# Patient Record
Sex: FEMALE | Race: WHITE | Employment: UNEMPLOYED | URBAN - METROPOLITAN AREA
[De-identification: names, ages, dates, MRNs, and addresses within clinical notes are randomized per-mention and may not be internally consistent; named-entity substitution may affect disease eponyms.]

---

## 2016-12-15 LAB
ERYTHROCYTE [DISTWIDTH] IN BLOOD BY AUTOMATED COUNT: 12.6 %
HBV SURFACE AG SERPL QL IA: NEGATIVE
HCT VFR BLD AUTO: 37.1 %
HEMOGLOBIN: 12.1 G/DL
HIV 1+2 AB+HIV1 P24 AG SERPL QL IA: NEGATIVE
MCH RBC QN AUTO: 30 PG
MCHC RBC AUTO-ENTMCNC: 33 G/DL
MCV RBC AUTO: 93 FL
PLATELET # BLD AUTO: 156 10^9/L
RBC # BLD AUTO: 4.01 10^12/L
RUBELLA ANTIBODY IGG QUANTITATIVE: NORMAL IU/ML
WBC # BLD AUTO: 9 10^9/L

## 2017-01-24 ENCOUNTER — OFFICE VISIT (OUTPATIENT)
Dept: CARDIOLOGY | Facility: CLINIC | Age: 27
End: 2017-01-24
Payer: COMMERCIAL

## 2017-01-24 VITALS
HEIGHT: 66 IN | DIASTOLIC BLOOD PRESSURE: 60 MMHG | HEART RATE: 72 BPM | SYSTOLIC BLOOD PRESSURE: 116 MMHG | BODY MASS INDEX: 24.48 KG/M2 | WEIGHT: 152.3 LBS

## 2017-01-24 DIAGNOSIS — R00.2 PALPITATIONS: Primary | ICD-10-CM

## 2017-01-24 PROCEDURE — 99204 OFFICE O/P NEW MOD 45 MIN: CPT | Mod: 25 | Performed by: INTERNAL MEDICINE

## 2017-01-24 PROCEDURE — 93000 ELECTROCARDIOGRAM COMPLETE: CPT | Performed by: INTERNAL MEDICINE

## 2017-01-24 PROCEDURE — T1013 SIGN LANG/ORAL INTERPRETER: HCPCS | Mod: U3 | Performed by: INTERNAL MEDICINE

## 2017-01-24 NOTE — PROGRESS NOTES
HPI and Plan:   See dictation    Orders Placed This Encounter   Procedures     Follow-Up with Cardiac Advanced Practice Provider     EKG 12-lead complete w/read - Clinics (performed today)     Cardiac Event Monitor     Echocardiogram       Orders Placed This Encounter   Medications     DISCONTD: Prenatal MV-Min-Fe Fum-FA-DHA (PRENATAL MULTIVITAMIN + DHA PO)     Sig:      Prenatal MV-Min-Fe Cbn-FA-DHA (PRENATAL PLUS DHA PO)     Sig: Take by mouth daily     Prenatal Vit-Fe Fumarate-FA (PRENATAL PO)     Sig: Take by mouth daily       Medications Discontinued During This Encounter   Medication Reason     Prenatal MV-Min-Fe Fum-FA-DHA (PRENATAL MULTIVITAMIN + DHA PO) Medication Reconciliation Clean Up         Encounter Diagnosis   Name Primary?     Palpitations Yes       CURRENT MEDICATIONS:  Current Outpatient Prescriptions   Medication Sig Dispense Refill     Prenatal MV-Min-Fe Cbn-FA-DHA (PRENATAL PLUS DHA PO) Take by mouth daily       Prenatal Vit-Fe Fumarate-FA (PRENATAL PO) Take by mouth daily         ALLERGIES   No Known Allergies    PAST MEDICAL HISTORY:  Past Medical History   Diagnosis Date     Palpitations        PAST SURGICAL HISTORY:  History reviewed. No pertinent past surgical history.    FAMILY HISTORY:  History reviewed. No pertinent family history.    SOCIAL HISTORY:  Social History     Social History     Marital Status:      Spouse Name: N/A     Number of Children: N/A     Years of Education: N/A     Social History Main Topics     Smoking status: Never Smoker      Smokeless tobacco: None     Alcohol Use: No     Drug Use: None     Sexual Activity: Not Asked     Other Topics Concern     Caffeine Concern Yes     tea or coffee 2-3 per day     Special Diet No     Exercise No     Social History Narrative       Review of Systems:  Skin:  Negative       Eyes:  Negative      ENT:  Negative      Respiratory:  Positive for shortness of breath;dyspnea on exertion;dyspnea at rest     Cardiovascular:     "palpitations;Positive for;chest pain;edema    Gastroenterology: Positive for heartburn    Genitourinary:  Negative      Musculoskeletal:    nocturnal cramping    Neurologic:  Negative      Psychiatric:  Negative      Heme/Lymph/Imm:  Negative      Endocrine:  Negative        Physical Exam:  Vitals: /60 mmHg  Pulse 72  Ht 1.676 m (5' 6\")  Wt 69.083 kg (152 lb 4.8 oz)  BMI 24.59 kg/m2    Constitutional:  cooperative;well developed;in no acute distress        Skin:  warm and dry to the touch        Head:  normocephalic        Eyes:  sclera white        ENT:  no pallor or cyanosis        Neck:  no stiffness        Chest:  clear to auscultation          Cardiac: regular rhythm;normal S1 and S2                  Abdomen:  abdomen soft        Vascular: pulses full and equal                                        Extremities and Back:  no edema              Neurological:  affect appropriate              CC  No referring provider defined for this encounter.                "

## 2017-01-24 NOTE — Clinical Note
1/24/2017    JAMIE MEJIA MD  [address]  [facility] [address]    RE: Linda Grijalva       Dear Colleague,    I had the pleasure of seeing Linda Grijalva in the [facility].    REQUESTING PROVIDER:  Columba Jarrell CNM.      INDICATION:  Palpitations.      Ms. Grijalva is a pleasant 26-year-old Zimbabwean-speaking female originally from [facility] [place] who moved to the [place] 7 years ago.  This evaluation is performed with the help of a Zimbabwean .      The patient states that she has had palpitations since the age of 10.  She previously did not seek medical care in [facility] but upon coming to the [place] was seen by a cardiologist when she was 19.  No definitive diagnosis was made.  She saw an additional cardiologist at an outside facility several years ago and was placed on metoprolol but was not given a diagnosis.  She stopped the metoprolol because of fatigue.      The patient states that her palpitations are quite irregular.  She can have 2 in 1 week and then not have palpitations in 4 months.  When it occurs, it is the sudden onset of a fast heart rate associated with generalized feelings of malaise.  Typically if she bears down, the palpitations will slow down or cease.  She does not have any other associated symptoms with the palpitations and has never had syncope or presyncope.      The patient is currently pregnant and is taking prenatal vitamins.  She has only had 1 episode since she has become pregnant.      We performed a 12-lead ECG in the office which shows sinus rhythm and is essentially unremarkable.      Please see my separate note with a full list of her allergies, medications, past medical history, social history, family history and review of systems.      Please see my full physical examination.   Outpatient Encounter Prescriptions as of 1/24/2017   Medication Sig Dispense Refill     Prenatal MV-Min-Fe Cbn-FA-DHA  (PRENATAL PLUS DHA PO) Take by mouth daily       Prenatal Vit-Fe Fumarate-FA (PRENATAL PO) Take by mouth daily       [DISCONTINUED] Prenatal MV-Min-Fe Fum-FA-DHA (PRENATAL MULTIVITAMIN + DHA PO)        No facility-administered encounter medications on file as of 1/24/2017.      IMPRESSION AND PLAN:  Mrs. Grijalva is a pleasant 26-year-old female with palpitations that appear consistent with possible paroxysmal supraventricular tachycardia.  Her 12-lead ECG shows sinus rhythm, and while there is a sort of slurring to the R-wave, I do not believe it represents a delta wave.  At this time, I have asked the patient to undergo a transthoracic echocardiogram to make sure that she has a structurally normal heart.  I will have her wear a 30-day event monitor, and hopefully we can capture 1 of these arrhythmias.  I believe that she has had a TSH performed through her OB's office, but I have instructed the patient to contact the OB's office and if a TSH has not been performed we can order this.  Finally, I have asked the patient to return to see one of EP APPs after the Holter and transthoracic echocardiogram have been completed.      Thank you for this consultation.     Sincerely,    Ayo Velazquez MD     [facility]

## 2017-01-25 ENCOUNTER — DOCUMENTATION ONLY (OUTPATIENT)
Dept: CARDIOLOGY | Facility: CLINIC | Age: 27
End: 2017-01-25

## 2017-01-25 DIAGNOSIS — R00.2 PALPITATIONS: Primary | ICD-10-CM

## 2017-01-25 NOTE — PROGRESS NOTES
REQUESTING PROVIDER:  Columba Jarrell CNM.      INDICATION:  Palpitations.      HISTORY OF PRESENT ILLNESS:  Ms. Grijalva is a pleasant 26-year-old Haitian-speaking female originally from [facility] who moved to the [place] years ago.  This evaluation is performed with the help of a Haitian .      The patient states that she has had palpitations since the age of 10.  She previously did not seek medical care in [facility] but upon coming to the [place] was seen by a cardiologist when she was 19.  No definitive diagnosis was made.  She saw an additional cardiologist at an outside facility several years ago and was placed on metoprolol but was not given a diagnosis.  She stopped the metoprolol because of fatigue.      The patient states that her palpitations are quite irregular.  She can have 2 in 1 week and then not have palpitations in 4 months.  When it occurs, it is the sudden onset of a fast heart rate associated with generalized feelings of malaise.  Typically if she bears down, the palpitations will slow down or cease.  She does not have any other associated symptoms with the palpitations and has never had syncope or presyncope.      The patient is currently pregnant and is taking prenatal vitamins.  She has only had 1 episode since she has become pregnant.      We performed a 12-lead ECG in the office which shows sinus rhythm and is essentially unremarkable.      Please see my separate note with a full list of her allergies, medications, past medical history, social history, family history and review of systems.      Please see my full physical examination.      IMPRESSION AND PLAN:  Mrs. Grijalva is a pleasant 26-year-old female with palpitations that appear consistent with possible paroxysmal supraventricular tachycardia.  Her 12-lead ECG shows sinus rhythm, and while there is a sort of slurring to the R-wave, I do not believe it represents a delta wave.  At this time, I have  asked the patient to undergo a transthoracic echocardiogram to make sure that she has a structurally normal heart.  I will have her wear a 30-day event monitor, and hopefully we can capture 1 of these arrhythmias.  I believe that she has had a TSH performed through her OB's office, but I have instructed the patient to contact the OB's office and if a TSH has not been performed we can order this.  Finally, I have asked the patient to return to see one of EP APPs after the Holter and transthoracic echocardiogram have been completed.      Thank you for this consultation.         LIANG GTUIERRES MD             D: 2017 14:19   T: 2017 22:23   MT: ELVIA      Name:     IRAJ BUSH   MRN:      [id number]        Account:      [id number]   :      1990           Service Date: 2017      Document: [id number]

## 2017-01-25 NOTE — PROGRESS NOTES
Received records from [facility], no TSH included. Order placed to have TSH done with other testing. Message to scheduling to call patient and add TSH to 1/30/17 appointments.  Labs sent to be entered, records sent to scan

## 2017-01-26 DIAGNOSIS — R00.2 PALPITATIONS: Primary | ICD-10-CM

## 2017-01-30 ENCOUNTER — HOSPITAL ENCOUNTER (OUTPATIENT)
Dept: CARDIOLOGY | Facility: CLINIC | Age: 27
Discharge: HOME | End: 2017-01-30
Attending: INTERNAL MEDICINE | Admitting: INTERNAL MEDICINE
Payer: COMMERCIAL

## 2017-01-30 DIAGNOSIS — R00.2 PALPITATIONS: ICD-10-CM

## 2017-01-30 PROCEDURE — 93306 TTE W/DOPPLER COMPLETE: CPT

## 2017-01-30 PROCEDURE — 93272 ECG/REVIEW INTERPRET ONLY: CPT | Performed by: INTERNAL MEDICINE

## 2017-01-30 PROCEDURE — 93306 TTE W/DOPPLER COMPLETE: CPT | Mod: 26 | Performed by: INTERNAL MEDICINE

## 2017-01-30 PROCEDURE — 93270 REMOTE 30 DAY ECG REV/REPORT: CPT

## 2017-02-01 ENCOUNTER — DOCUMENTATION ONLY (OUTPATIENT)
Dept: CARDIOLOGY | Facility: CLINIC | Age: 27
End: 2017-02-01

## 2017-02-01 NOTE — PROGRESS NOTES
Baseline Sinus Rhythm HR 85bpm   Second transmissions HR 77 bpm moderate activity Symtpoms Skipped beats. Sent to file.

## 2017-02-04 ENCOUNTER — TELEPHONE (OUTPATIENT)
Dept: OTHER | Facility: CLINIC | Age: 27
End: 2017-02-04

## 2017-02-04 ENCOUNTER — DOCUMENTATION ONLY (OUTPATIENT)
Dept: CARDIOLOGY | Facility: CLINIC | Age: 27
End: 2017-02-04

## 2017-02-04 DIAGNOSIS — I47.10 PAROXYSMAL SUPRAVENTRICULAR TACHYCARDIA (H): Primary | ICD-10-CM

## 2017-02-04 NOTE — PROGRESS NOTES
At OV 1-24-17  Dr. Velazquez's  recommendation was for TSH. Lab record request faxed to PMD, OB doctor for TSH lab . If not done,  lab may be ordered at OV 2-27-17 with . HENRY. OV.

## 2017-02-04 NOTE — TELEPHONE ENCOUNTER
I was called by the event monitoring service at 1215 AM. The patient recorded a 90 second episode of narrow complex tachycardia with rates from 180-220 bpm. At the time of notification the patient was in sinus at 64 bpm. There were no further recurrences overnight.     I contacted the patient this morning with the assistance of a Mosotho telephone . She has had episodes similar to this since age 10. Her palpitations continue to occur unpredictably, but she is reliably able to bear down and break the SVT. She has never had an episode that she cannot terminate. She had transient left chest discomfort with the SVT last night, but denied dizziness or shortness of breath. She is 18 weeks pregnant and taking no medications.    I instructed the patient that if she experiences recurrence which she cannot immediately break with bearing down that she should call 911. Our office will contact her Monday to schedule more immediate follow up.     Billie Deras MD

## 2017-02-06 NOTE — PROGRESS NOTES
cardionet strip 2/2/17 showing sinus rhythm @ 100 BPM noted.Strips placed in event folder.    cardionet strip 2/3/17 showing SVT @ 223 BPM noted. Patient's PMD was called by cardionet at time of event. Patient was contacted and per notes indicated she can break her SVT on her own. Patient has EP follow up 2/27/17 with HENRY Kate Dowell. Strips placed in event folder.

## 2017-02-23 PROBLEM — R00.2 PALPITATIONS: Status: ACTIVE | Noted: 2017-02-23

## 2017-02-23 NOTE — PROGRESS NOTES
cardionet strip 2/21/17 showing sinus tachycardia @ 120 BPM noted, strip placed in event folder  Spoke with PMD clinic and patient did not set up any visits yet, they have no records for patient

## 2017-02-27 ENCOUNTER — OFFICE VISIT (OUTPATIENT)
Dept: CARDIOLOGY | Facility: CLINIC | Age: 27
End: 2017-02-27
Attending: INTERNAL MEDICINE
Payer: COMMERCIAL

## 2017-02-27 VITALS
HEART RATE: 80 BPM | HEIGHT: 66 IN | SYSTOLIC BLOOD PRESSURE: 120 MMHG | BODY MASS INDEX: 25.75 KG/M2 | WEIGHT: 160.2 LBS | DIASTOLIC BLOOD PRESSURE: 64 MMHG

## 2017-02-27 DIAGNOSIS — R00.2 PALPITATIONS: ICD-10-CM

## 2017-02-27 PROCEDURE — 99214 OFFICE O/P EST MOD 30 MIN: CPT | Performed by: PHYSICIAN ASSISTANT

## 2017-02-27 NOTE — PROGRESS NOTES
"HPI and Plan:   See dictation #[id number]    Orders Placed This Encounter   Procedures     Follow-Up with Electrophysiologist       Medications Discontinued During This Encounter   Medication Reason     Prenatal Vit-Fe Fumarate-FA (PRENATAL PO) Medication Reconciliation Clean Up       Encounter Diagnosis   Name Primary?     Palpitations        CURRENT MEDICATIONS:  Current Outpatient Prescriptions   Medication Sig Dispense Refill     Prenatal MV-Min-Fe Cbn-FA-DHA (PRENATAL PLUS DHA PO) Take by mouth daily         ALLERGIES   No Known Allergies    PAST MEDICAL HISTORY:  Past Medical History   Diagnosis Date     Palpitations        PAST SURGICAL HISTORY:  History reviewed. No pertinent past surgical history.    FAMILY HISTORY:  Family History   Problem Relation Age of Onset     Coronary Artery Disease No family hx of        SOCIAL HISTORY:  Social History     Social History     Marital status:      Spouse name: N/A     Number of children: N/A     Years of education: N/A     Social History Main Topics     Smoking status: Never Smoker     Smokeless tobacco: None     Alcohol use No     Drug use: None     Sexual activity: Not Asked     Other Topics Concern     Caffeine Concern Yes     tea or coffee 2-3 per day     Special Diet No     Exercise No     Social History Narrative       Review of Systems:  Skin:  Negative       Eyes:  Negative      ENT:  Negative      Respiratory:  Negative for shortness of breath;dyspnea on exertion     Cardiovascular:  lightheadedness;dizziness;Negative for palpitations;Positive for;fatigue;exercise intolerance    Gastroenterology: Positive for heartburn    Genitourinary:  Negative      Musculoskeletal:  Negative      Neurologic:  Negative      Psychiatric:  Negative      Heme/Lymph/Imm:  Negative      Endocrine:  Negative        Physical Exam:  Vitals: /64  Pulse 80  Ht 1.676 m (5' 6\")  Wt 72.7 kg (160 lb 3.2 oz)  BMI 25.86 kg/m2    Constitutional:  cooperative;well developed;in no " acute distress        Skin:  warm and dry to the touch        Head:  normocephalic        Eyes:  sclera white;pupils equal and round;conjunctivae and lids unremarkable;no xanthalasma        ENT:  no pallor or cyanosis        Neck:  JVP normal        Chest:  normal breath sounds, clear to auscultation, normal A-P diameter, normal symmetry, normal respiratory excursion, no use of accessory muscles          Cardiac: regular rhythm;normal S1 and S2                  Abdomen:      Gravid ~ 19 weeks    Vascular: pulses full and equal                                        Extremities and Back:  no edema;no deformities, clubbing, cyanosis, erythema observed              Neurological:  affect appropriate

## 2017-02-27 NOTE — PATIENT INSTRUCTIONS
1. Reviewed NORMAL echocardiogram. Strong heart with good heart pumping function. Normal valves    2. Reviewed 30 day monitor showing episodes of Supraventricular Tachycardia (SVT) - a fast heart rate that comes from the top of the heart.   Options for treatment include: DO NOTHING - this is not a dangerous rhythm UNLESS you are getting lightheaded/dizzy or chest pain with it.                                                                RESTART metoprolol - this is a medicine that we can use during pregnancy for these funny rhythms.        ABLATION - a procedure in the hospital (go home same day) to 'short circuit' this area of the electrical system that is causing heart to go too fast.  We'd prefer to wait until after you deliver if possible.    3. Let's make a plan to reevaluate after you deliver to determine if we should start medications or go ahead with ablation.    4. MY NURSE IS TACO: [phone]

## 2017-02-27 NOTE — LETTER
2/27/2017    [facility] ARIAN MEJIA MD  [address]  [facility] [address]    RE: Linda Grijalva       Dear Colleague,    I had the pleasure of meeting Linda today when she came accompanied by her Dominican  for followup of her recent testing.  She is a very pleasant 26-year-old who is pregnant at roughly 19-20 weeks.  She has had a long history of palpitations dating back to when she was just 10 years old.  She [place] cardiologist [place] she was 19.  She does not remember having any sort of definitive diagnosis but was placed on metoprolol.  She stopped the metoprolol due to fatigue.      She saw Dr. Velazquez in consultation in January.  At that time, he obtained an echocardiogram and a 30-day event monitor.      She is now back for followup stating that she had 1 episode of her typical palpitations.  This correlates with SVT on her event monitor, Friday, 02/03, at 10:30 p.m.  She states that the episode came on out of the blue like it typically does.  She did vagal maneuvers and was able to break the SVT.  Monitor showed SVT at roughly 223 beats per minute.      Event monitor otherwise showed normal sinus rhythm with no significant ectopy.  She did have some sinus tachycardia noted.      Echocardiogram done 01/30 showed a normal ejection fraction at 60%-65% with normal valves and normal right ventricular function.     Outpatient Encounter Prescriptions as of 2/27/2017   Medication Sig Dispense Refill     Prenatal MV-Min-Fe Cbn-FA-DHA (PRENATAL PLUS DHA PO) Take by mouth daily       [DISCONTINUED] Prenatal Vit-Fe Fumarate-FA (PRENATAL PO) Take by mouth daily       No facility-administered encounter medications on file as of 2/27/2017.       ASSESSMENT AND PLAN:   1.  SVT.  Her event monitor showed an episode of symptomatic SVT on 02/03 which broke with vagal maneuvers, which she has done since she was 10 years old to try to help improve this.  She cannot think of anything that  "triggered it.      At this time, we talked about our options including doing nothing, which we can do only given that she has not had any severe presyncope or syncopal episodes associated with this.  We also could restart metoprolol during pregnancy, being careful to evaluate for any intrauterine growth delay, or an ablation.  I explained that an ablation would be somewhat of a last resort during pregnancy, and we would prefer to avoid this until after she delivers      Linda tells me that overall she is really doing well and only wanted to get this evaluated so she could have it documented for her OB/GYN in case something she were to go into an arrhythmia during labor or delivery.  It does appear that this is SVT, and I explained that this is generally a very symptomatic but not dangerous rhythm.  Certainly things would change if she were to become hemodynamically unstable with very rapid heart rates or prolonged episodes but all of her episodes have been able to break with a vagal maneuver.      She is not interested in starting medications, which we are very comfortable with.  At this time, she will continue \"watching and waiting.\"  I explained that we could always restart metoprolol if needed, and if she were to have any issues or concerns during delivery, certainly [facility] could consult Electrophysiology.      At this time, we will plan to have her visit with one of our electrophysiologists in August or September (when the baby is 2-3 months old) to discuss her symptoms and if she would like to proceed with ablation.      It has been a pleasure to see her in clinic.  Again, she was instructed to contact us or seek immediate attention she have any presyncope or syncopal episodes associated.     Sincerely,    Raeann Dowell PA-C     [facility]        "

## 2017-02-27 NOTE — MR AVS SNAPSHOT
After Visit Summary   2/27/2017    Linda Grijalva    MRN: [id number]           Patient Information     Date Of Birth          1990        Visit Information        Provider Department      2/27/2017 12:15 PM Raeann Dowell PA-C; [employer] [facility]        Today's Diagnoses     Palpitations          Care Instructions    1. Reviewed NORMAL echocardiogram. Strong heart with good heart pumping function. Normal valves    2. Reviewed 30 day monitor showing episodes of Supraventricular Tachycardia (SVT) - a fast heart rate that comes from the top of the heart.   Options for treatment include: DO NOTHING - this is not a dangerous rhythm UNLESS you are getting lightheaded/dizzy or chest pain with it.                                                                RESTART metoprolol - this is a medicine that we can use during pregnancy for these funny rhythms.        ABLATION - a procedure in the hospital (go home same day) to 'short circuit' this area of the electrical system that is causing heart to go too fast.  We'd prefer to wait until after you deliver if possible.    3. Let's make a plan to reevaluate after you deliver to determine if we should start medications or go ahead with ablation.    4. MY NURSE IS TACO: [phone]                                                               Follow-ups after your visit        Who to contact     If you have questions or need follow up information about today's clinic visit or your schedule please contact [facility] [place] directly at [phone].  Normal or non-critical lab and imaging results will be communicated to you by MyChart, letter or phone within 4 business days after the clinic has received the results. If you do not hear from us within 7 days, please contact the clinic through MyChart or phone. If you have a critical or abnormal lab  "result, we will notify you by phone as soon as possible.  Submit refill requests through [employer] or call your pharmacy and they will forward the refill request to us. Please allow 3 business days for your refill to be completed.          Additional Information About Your Visit        [employer] Information     [employer] lets you send messages to your doctor, view your test results, renew your prescriptions, schedule appointments and more. To sign up, go to [email or web address] . Click on \"Log in\" on the left side of the screen, which will take you to the Welcome page. Then click on \"Sign up Now\" on the right side of the page.     You will be asked to enter the access code listed below, as well as some personal information. Please follow the directions to create your username and password.     Your access code is: [email or web address]  Expires: 2017 12:52 PM     Your access code will  in 90 days. If you need help or a new code, please call your [facility] clinic or [phone].        Care EveryWhere ID     This is your Care EveryWhere ID. This could be used by other organizations to access your [facility] medical records  [id number]        Your Vitals Were     Pulse Height BMI (Body Mass Index)             80 1.676 m (5' 6\") 25.86 kg/m2          Blood Pressure from Last 3 Encounters:   17 120/64   17 116/60    Weight from Last 3 Encounters:   17 72.7 kg (160 lb 3.2 oz)   17 69.1 kg (152 lb 4.8 oz)              We Performed the Following     Follow-Up with Cardiac Advanced Practice Provider        Primary Care Provider Office Phone # Fax #    Fam Phys MD Dianna [phone] [address]  [facility] [address]        Thank you!     Thank you for choosing [facility] [place]  for your care. Our goal is always to provide you with excellent care. Hearing back from our patients is one way we can continue to improve our " services. Please take a few minutes to complete the written survey that you may receive in the mail after your visit with us. Thank you!             Your Updated Medication List - Protect others around you: Learn how to safely use, store and throw away your medicines at www.disposemymeds.org.          This list is accurate as of: 2/27/17 12:52 PM.  Always use your most recent med list.                   Brand Name Dispense Instructions for use    PRENATAL PLUS DHA PO      Take by mouth daily

## 2017-02-28 NOTE — PROGRESS NOTES
HISTORY OF PRESENT ILLNESS:  I had the pleasure of meeting Linda today when she came accompanied by her Malagasy  for followup of her recent testing.  She is a very pleasant 26-year-old who is pregnant at roughly 19-20 weeks.  She has had a long history of palpitations dating back to when she was just 10 years old.  She saw a cardiologist in the [place] when she was 19.  She does not remember having any sort of definitive diagnosis but was placed on metoprolol.  She stopped the metoprolol due to fatigue.      She saw Dr. Velazquez in consultation in January.  At that time, he obtained an echocardiogram and a 30-day event monitor.      She is now back for followup stating that she had 1 episode of her typical palpitations.  This correlates with SVT on her event monitor, Friday, 02/03, at 10:30 p.m.  She states that the episode came on out of the blue like it typically does.  She did vagal maneuvers and was able to break the SVT.  Monitor showed SVT at roughly 223 beats per minute.      Event monitor otherwise showed normal sinus rhythm with no significant ectopy.  She did have some sinus tachycardia noted.      Echocardiogram done 01/30 showed a normal ejection fraction at 60%-65% with normal valves and normal right ventricular function.      ASSESSMENT AND PLAN:   1.  SVT.  Her event monitor showed an episode of symptomatic SVT on 02/03 which broke with vagal maneuvers, which she has done since she was 10 years old to try to help improve this.  She cannot think of anything that triggered it.      At this time, we talked about our options including doing nothing, which we can do only given that she has not had any severe presyncope or syncopal episodes associated with this.  We also could restart metoprolol during pregnancy, being careful to evaluate for any intrauterine growth delay, or an ablation.  I explained that an ablation would be somewhat of a last resort during pregnancy, and we would prefer to avoid  "this until after she delivers      Iraj tells me that overall she is really doing well and only wanted to get this evaluated so she could have it documented for her OB/GYN in case something she were to go into an arrhythmia during labor or delivery.  It does appear that this is SVT, and I explained that this is generally a very symptomatic but not dangerous rhythm.  Certainly things would change if she were to become hemodynamically unstable with very rapid heart rates or prolonged episodes but all of her episodes have been able to break with a vagal maneuver.      She is not interested in starting medications, which we are very comfortable with.  At this time, she will continue \"watching and waiting.\"  I explained that we could always restart metoprolol if needed, and if she were to have any issues or concerns during delivery, certainly [facility] could consult Electrophysiology.      At this time, we will plan to have her visit with one of our electrophysiologists in August or September (when the baby is 2-3 months old) to discuss her symptoms and if she would like to proceed with ablation.      It has been a pleasure to see her in clinic.  Again, she was instructed to contact us or seek immediate attention she have any presyncope or syncopal episodes associated.         SANTOS CAO PA-C             D: 2017 13:08   T: 2017 19:12   MT: ELVIA      Name:     IRAJ BUSH   MRN:      [id number]        Account:      [id number]   :      1990           Service Date: 2017      Document: [id number]      "

## 2017-06-08 LAB — GROUP B STREP PCR: NEGATIVE

## 2017-06-25 ENCOUNTER — HOSPITAL ENCOUNTER (OUTPATIENT)
Facility: CLINIC | Age: 27
Discharge: HOME | End: 2017-06-25
Attending: OBSTETRICS & GYNECOLOGY | Admitting: OBSTETRICS & GYNECOLOGY
Payer: COMMERCIAL

## 2017-06-25 ENCOUNTER — HOSPITAL ENCOUNTER (INPATIENT)
Facility: CLINIC | Age: 27
LOS: 2 days | Discharge: HOME HEALTH | End: 2017-06-27
Attending: OBSTETRICS & GYNECOLOGY | Admitting: OBSTETRICS & GYNECOLOGY
Payer: COMMERCIAL

## 2017-06-25 ENCOUNTER — ANESTHESIA EVENT (OUTPATIENT)
Dept: OBGYN | Facility: CLINIC | Age: 27
End: 2017-06-25
Payer: COMMERCIAL

## 2017-06-25 ENCOUNTER — HOSPITAL ENCOUNTER (INPATIENT)
Facility: CLINIC | Age: 27
LOS: 1 days | Discharge: HOME | End: 2017-06-25
Attending: OBSTETRICS & GYNECOLOGY | Admitting: OBSTETRICS & GYNECOLOGY
Payer: COMMERCIAL

## 2017-06-25 ENCOUNTER — ANESTHESIA (OUTPATIENT)
Dept: OBGYN | Facility: CLINIC | Age: 27
End: 2017-06-25
Payer: COMMERCIAL

## 2017-06-25 VITALS
RESPIRATION RATE: 18 BRPM | HEIGHT: 66 IN | SYSTOLIC BLOOD PRESSURE: 126 MMHG | BODY MASS INDEX: 29.89 KG/M2 | TEMPERATURE: 97.4 F | WEIGHT: 186 LBS | DIASTOLIC BLOOD PRESSURE: 82 MMHG

## 2017-06-25 VITALS — DIASTOLIC BLOOD PRESSURE: 89 MMHG | SYSTOLIC BLOOD PRESSURE: 134 MMHG | TEMPERATURE: 97.6 F | RESPIRATION RATE: 16 BRPM

## 2017-06-25 DIAGNOSIS — D62 ANEMIA DUE TO BLOOD LOSS, ACUTE: Primary | ICD-10-CM

## 2017-06-25 LAB
ABO + RH BLD: NORMAL
ABO + RH BLD: NORMAL
SPECIMEN EXP DATE BLD: NORMAL

## 2017-06-25 PROCEDURE — 99213 OFFICE O/P EST LOW 20 MIN: CPT

## 2017-06-25 PROCEDURE — 99214 OFFICE O/P EST MOD 30 MIN: CPT

## 2017-06-25 PROCEDURE — 12000029 ZZH R&B OB INTERMEDIATE

## 2017-06-25 PROCEDURE — 00HU33Z INSERTION OF INFUSION DEVICE INTO SPINAL CANAL, PERCUTANEOUS APPROACH: ICD-10-PCS | Performed by: ANESTHESIOLOGY

## 2017-06-25 PROCEDURE — 37000011 ZZH ANESTHESIA WARD SERVICE

## 2017-06-25 PROCEDURE — 25000128 H RX IP 250 OP 636

## 2017-06-25 PROCEDURE — 99215 OFFICE O/P EST HI 40 MIN: CPT

## 2017-06-25 PROCEDURE — 12000031 ZZH R&B OB CRITICAL

## 2017-06-25 PROCEDURE — 25000125 ZZHC RX 250: Performed by: ANESTHESIOLOGY

## 2017-06-25 PROCEDURE — 3E0R3CZ INTRODUCTION OF REGIONAL ANESTHETIC INTO SPINAL CANAL, PERCUTANEOUS APPROACH: ICD-10-PCS | Performed by: ANESTHESIOLOGY

## 2017-06-25 PROCEDURE — 86900 BLOOD TYPING SEROLOGIC ABO: CPT | Performed by: OBSTETRICS & GYNECOLOGY

## 2017-06-25 PROCEDURE — 86780 TREPONEMA PALLIDUM: CPT | Performed by: OBSTETRICS & GYNECOLOGY

## 2017-06-25 PROCEDURE — 25000128 H RX IP 250 OP 636: Performed by: OBSTETRICS & GYNECOLOGY

## 2017-06-25 PROCEDURE — 86901 BLOOD TYPING SEROLOGIC RH(D): CPT | Performed by: OBSTETRICS & GYNECOLOGY

## 2017-06-25 PROCEDURE — 40000671 ZZH STATISTIC ANESTHESIA CASE

## 2017-06-25 PROCEDURE — S0020 INJECTION, BUPIVICAINE HYDRO: HCPCS | Performed by: ANESTHESIOLOGY

## 2017-06-25 RX ORDER — OXYCODONE AND ACETAMINOPHEN 5; 325 MG/1; MG/1
1 TABLET ORAL
Status: DISCONTINUED | OUTPATIENT
Start: 2017-06-25 | End: 2017-06-25 | Stop reason: HOSPADM

## 2017-06-25 RX ORDER — SODIUM CHLORIDE, SODIUM LACTATE, POTASSIUM CHLORIDE, CALCIUM CHLORIDE 600; 310; 30; 20 MG/100ML; MG/100ML; MG/100ML; MG/100ML
INJECTION, SOLUTION INTRAVENOUS CONTINUOUS
Status: DISCONTINUED | OUTPATIENT
Start: 2017-06-25 | End: 2017-06-26

## 2017-06-25 RX ORDER — NALBUPHINE HYDROCHLORIDE 10 MG/ML
2.5-5 INJECTION, SOLUTION INTRAMUSCULAR; INTRAVENOUS; SUBCUTANEOUS EVERY 6 HOURS PRN
Status: DISCONTINUED | OUTPATIENT
Start: 2017-06-25 | End: 2017-06-26

## 2017-06-25 RX ORDER — METHYLERGONOVINE MALEATE 0.2 MG/ML
200 INJECTION INTRAVENOUS
Status: DISCONTINUED | OUTPATIENT
Start: 2017-06-25 | End: 2017-06-25 | Stop reason: HOSPADM

## 2017-06-25 RX ORDER — EPHEDRINE SULFATE 50 MG/ML
INJECTION, SOLUTION INTRAMUSCULAR; INTRAVENOUS; SUBCUTANEOUS
Status: DISCONTINUED
Start: 2017-06-25 | End: 2017-06-26 | Stop reason: HOSPADM

## 2017-06-25 RX ORDER — ONDANSETRON 2 MG/ML
4 INJECTION INTRAMUSCULAR; INTRAVENOUS EVERY 6 HOURS PRN
Status: DISCONTINUED | OUTPATIENT
Start: 2017-06-25 | End: 2017-06-25 | Stop reason: HOSPADM

## 2017-06-25 RX ORDER — ACETAMINOPHEN 325 MG/1
650 TABLET ORAL EVERY 4 HOURS PRN
Status: DISCONTINUED | OUTPATIENT
Start: 2017-06-25 | End: 2017-06-25 | Stop reason: HOSPADM

## 2017-06-25 RX ORDER — BUPIVACAINE HYDROCHLORIDE 2.5 MG/ML
INJECTION, SOLUTION EPIDURAL; INFILTRATION; INTRACAUDAL PRN
Status: DISCONTINUED | OUTPATIENT
Start: 2017-06-25 | End: 2017-06-25

## 2017-06-25 RX ORDER — HYDROXYZINE HYDROCHLORIDE 50 MG/1
100 TABLET, FILM COATED ORAL ONCE
Status: COMPLETED | OUTPATIENT
Start: 2017-06-25 | End: 2017-06-25

## 2017-06-25 RX ORDER — ACETAMINOPHEN 325 MG/1
650 TABLET ORAL EVERY 4 HOURS PRN
Status: DISCONTINUED | OUTPATIENT
Start: 2017-06-25 | End: 2017-06-26

## 2017-06-25 RX ORDER — OXYTOCIN 10 [USP'U]/ML
10 INJECTION, SOLUTION INTRAMUSCULAR; INTRAVENOUS
Status: DISCONTINUED | OUTPATIENT
Start: 2017-06-25 | End: 2017-06-25 | Stop reason: HOSPADM

## 2017-06-25 RX ORDER — CARBOPROST TROMETHAMINE 250 UG/ML
250 INJECTION, SOLUTION INTRAMUSCULAR
Status: DISCONTINUED | OUTPATIENT
Start: 2017-06-25 | End: 2017-06-26

## 2017-06-25 RX ORDER — NALOXONE HYDROCHLORIDE 0.4 MG/ML
.1-.4 INJECTION, SOLUTION INTRAMUSCULAR; INTRAVENOUS; SUBCUTANEOUS
Status: DISCONTINUED | OUTPATIENT
Start: 2017-06-25 | End: 2017-06-25 | Stop reason: HOSPADM

## 2017-06-25 RX ORDER — IBUPROFEN 800 MG/1
800 TABLET, FILM COATED ORAL
Status: DISCONTINUED | OUTPATIENT
Start: 2017-06-25 | End: 2017-06-26

## 2017-06-25 RX ORDER — NALOXONE HYDROCHLORIDE 0.4 MG/ML
.1-.4 INJECTION, SOLUTION INTRAMUSCULAR; INTRAVENOUS; SUBCUTANEOUS
Status: DISCONTINUED | OUTPATIENT
Start: 2017-06-25 | End: 2017-06-26

## 2017-06-25 RX ORDER — LIDOCAINE 40 MG/G
CREAM TOPICAL
Status: DISCONTINUED | OUTPATIENT
Start: 2017-06-25 | End: 2017-06-25 | Stop reason: HOSPADM

## 2017-06-25 RX ORDER — OXYCODONE AND ACETAMINOPHEN 5; 325 MG/1; MG/1
1 TABLET ORAL
Status: DISCONTINUED | OUTPATIENT
Start: 2017-06-25 | End: 2017-06-26

## 2017-06-25 RX ORDER — FENTANYL CITRATE 50 UG/ML
50-100 INJECTION, SOLUTION INTRAMUSCULAR; INTRAVENOUS
Status: DISCONTINUED | OUTPATIENT
Start: 2017-06-25 | End: 2017-06-25 | Stop reason: HOSPADM

## 2017-06-25 RX ORDER — IBUPROFEN 800 MG/1
800 TABLET, FILM COATED ORAL
Status: DISCONTINUED | OUTPATIENT
Start: 2017-06-25 | End: 2017-06-25 | Stop reason: HOSPADM

## 2017-06-25 RX ORDER — EPHEDRINE SULFATE 50 MG/ML
5 INJECTION, SOLUTION INTRAMUSCULAR; INTRAVENOUS; SUBCUTANEOUS
Status: DISCONTINUED | OUTPATIENT
Start: 2017-06-25 | End: 2017-06-26

## 2017-06-25 RX ORDER — CARBOPROST TROMETHAMINE 250 UG/ML
250 INJECTION, SOLUTION INTRAMUSCULAR
Status: DISCONTINUED | OUTPATIENT
Start: 2017-06-25 | End: 2017-06-25 | Stop reason: HOSPADM

## 2017-06-25 RX ORDER — OXYTOCIN 10 [USP'U]/ML
10 INJECTION, SOLUTION INTRAMUSCULAR; INTRAVENOUS
Status: DISCONTINUED | OUTPATIENT
Start: 2017-06-25 | End: 2017-06-26

## 2017-06-25 RX ORDER — LIDOCAINE 40 MG/G
CREAM TOPICAL
Status: DISCONTINUED | OUTPATIENT
Start: 2017-06-25 | End: 2017-06-26

## 2017-06-25 RX ORDER — SODIUM CHLORIDE, SODIUM LACTATE, POTASSIUM CHLORIDE, CALCIUM CHLORIDE 600; 310; 30; 20 MG/100ML; MG/100ML; MG/100ML; MG/100ML
INJECTION, SOLUTION INTRAVENOUS CONTINUOUS
Status: DISCONTINUED | OUTPATIENT
Start: 2017-06-25 | End: 2017-06-25 | Stop reason: HOSPADM

## 2017-06-25 RX ORDER — OXYTOCIN/0.9 % SODIUM CHLORIDE 30/500 ML
100-340 PLASTIC BAG, INJECTION (ML) INTRAVENOUS CONTINUOUS PRN
Status: DISCONTINUED | OUTPATIENT
Start: 2017-06-25 | End: 2017-06-26

## 2017-06-25 RX ORDER — FENTANYL CITRATE 50 UG/ML
50-100 INJECTION, SOLUTION INTRAMUSCULAR; INTRAVENOUS
Status: DISCONTINUED | OUTPATIENT
Start: 2017-06-25 | End: 2017-06-26

## 2017-06-25 RX ORDER — METHYLERGONOVINE MALEATE 0.2 MG/ML
200 INJECTION INTRAVENOUS
Status: DISCONTINUED | OUTPATIENT
Start: 2017-06-25 | End: 2017-06-26

## 2017-06-25 RX ORDER — OXYTOCIN/0.9 % SODIUM CHLORIDE 30/500 ML
100-340 PLASTIC BAG, INJECTION (ML) INTRAVENOUS CONTINUOUS PRN
Status: DISCONTINUED | OUTPATIENT
Start: 2017-06-25 | End: 2017-06-25 | Stop reason: HOSPADM

## 2017-06-25 RX ADMIN — Medication: at 23:13

## 2017-06-25 RX ADMIN — BUPIVACAINE HYDROCHLORIDE 10 ML: 2.5 INJECTION, SOLUTION EPIDURAL; INFILTRATION; INTRACAUDAL at 23:13

## 2017-06-25 RX ADMIN — SODIUM CHLORIDE, POTASSIUM CHLORIDE, SODIUM LACTATE AND CALCIUM CHLORIDE 1000 ML: 600; 310; 30; 20 INJECTION, SOLUTION INTRAVENOUS at 22:31

## 2017-06-25 RX ADMIN — HYDROXYZINE HYDROCHLORIDE 100 MG: 50 TABLET, FILM COATED ORAL at 19:09

## 2017-06-25 RX ADMIN — SODIUM CHLORIDE, POTASSIUM CHLORIDE, SODIUM LACTATE AND CALCIUM CHLORIDE: 600; 310; 30; 20 INJECTION, SOLUTION INTRAVENOUS at 23:33

## 2017-06-25 NOTE — PROVIDER NOTIFICATION
06/25/17 0246   Provider Notification   Provider Name/Title Dr. Dasilva   Method of Notification Phone   Request Evaluate - Remote   Notification Reason Status Update;SVE   Dr. Dasilva updated on unchanged SVE after walking for an hour. Orders for 100 mg vistaril PO and to discharge the patient home.

## 2017-06-25 NOTE — IP AVS SNAPSHOT
[facility] Labor and Delivery    [address]    [facility] [address]    Phone:  [phone]    Fax:  [phone]                                       After Visit Summary   6/25/2017    Linda Serrano    MRN: [id number]           After Visit Summary Signature Page     I have received my discharge instructions, and my questions have been answered. I have discussed any challenges I see with this plan with the nurse or doctor.    ..........................................................................................................................................  Patient/Patient Representative Signature      ..........................................................................................................................................  Patient Representative Print Name and Relationship to Patient    ..................................................               ................................................  Date                                            Time    ..........................................................................................................................................  Reviewed by Signature/Title    ...................................................              ..............................................  Date                                                            Time

## 2017-06-25 NOTE — IP AVS SNAPSHOT
MRN:[id number]                      After Visit Summary   6/25/2017    Linda Serrano    MRN: [id number]           Thank you!     Thank you for choosing [facility] for your care. Our goal is always to provide you with excellent care. Hearing back from our patients is one way we can continue to improve our services. Please take a few minutes to complete the written survey that you may receive in the mail after you visit. If you would like to speak to someone directly about your visit please contact Patient Relations at [phone]. Thank you!          Patient Information     Date Of Birth          1990        About your hospital stay     You were admitted on:  June 25, 2017 You last received care in the:  [facility] Postpartum    You were discharged on:  June 27, 2017       Who to Call     For medical emergencies, please call 911.  For non-urgent questions about your medical care, please call your primary care provider or clinic, [phone]          Attending Provider     Provider Specialty    Shoaib Dasilva MD OB/Gyn       Primary Care Provider Office Phone # Fax #    Zrw Pwaw MD Dianna [phone]      After Care Instructions     Activity       Review discharge instructions            Diet       Resume previous diet            Discharge Instructions - Postpartum visit       Schedule postpartum visit with your provider and return to clinic in 6 weeks.  Rx given for ibuprofen, colace and iron supplement.                  Further instructions from your care team       Vaginal Delivery Discharge Instructions: Russian  Follow up in  6 weeks  Lactation [phone]  [facility] [phone]  Ôèçè÷åñêàÿ íàãðóçêà:     Åñëè âàì íåîáõîäèìà ïîìîùü, ïîïðîñèòå î íåé ñâîèõ ðîäñòâåííèêîâ è äðóçåé.    Íå ââîäèòå âî âëàãàëèùå íè÷åãî, ïîêà íå ïîëó÷èòå íà ýòî ðàçðåøåíèå âðà÷à.    Ïîñòàðàéòåñü îòêàçàòüñÿ îò ôèçè÷åñêîé íàãðóçêè â òå÷åíèå íåñêîëüêèõ íåäåëü, ÷òîáû  ïîçâîëèòü îðãàíèçìó âîññòàíîâèòüñÿ. Âû ìîæåòå çàíèìàòüñÿ ëþáîé äåÿòåëüíîñòüþ, íî íå ïåðåóñåðäñòâóéòå.    Íå ñàäèòåñü çà ðóëü àâòîìîáèëÿ, åñëè âû ïðèíèìàåòå áîëåóòîëÿþùèå ïðåïàðàòû, âûïèñàííûå âðà÷îì. Âîæäåíèå àâòîìîáèëÿ ðàçðåøàåòñÿ, åñëè áîëåóòîëÿþùèå ëåêàðñòâà, êîòîðûå âû ïðèíèìàåòå, ìîæíî êóïèòü áåç ðåöåïòà.    Ïîçâîíèòå âàøåìó ïîñòàâùèêó ìåäèöèíñêèõ óñëóã, åñëè ó âàñ íàáëþäàåòñÿ ëþáîé èç ñëåäóþùèõ ñèìïòîìîâ:    Ãèãèåíè÷åñêàÿ ïðîêëàäêà íàñêâîçü ïðîìîêëà îò êðîâè â òå÷åíèå îäíîãî ÷àñà èëè âû çàìåòèëè ñãóñòêè êðîâè ðàçìåðîì áîëüøå, ÷åì ìÿ÷ äëÿ èãðû â ãîëüô.    Êðîâîòå÷åíèå íå ïðåêðàùàåòñÿ â òå÷åíèå áîëåå ÷åì 6 íåäåëü.    Ó âàñ èìåþòñÿ âëàãàëèùíûå âûäåëåíèÿ ñ íåïðèÿòíûì çàïàõîì.     Òåìïåðàòóðà 100,4  F (38  C) èëè âûøå (ïðè èçìåðåíèè òåìïåðàòóðû ïîä ÿçûêîì), ñîïðîâîæäàþùàÿñÿ îçíîáîì èëè áåç íåãî.     Ñèëüíàÿ áîëü, ñõâàòêîîáðàçíàÿ áîëü èëè áîëåçíåííûå îùóùåíèÿ âíèçó æèâîòà.    Óñèëåíèå áîëè, îòå÷íîñòü, ïîêðàñíåíèå èëè ñêîïëåíèå æèäêîñòè â ìåñòå íàëîæåíèÿ øâîâ.    Æåëàíèå ìî÷èòüñÿ áîëåå ÷àñòî è íåñïîñîáíîñòü äîëãî òåðïåòü èëè îùóùåíèå ææåíèÿ âî âðåìÿ ìî÷åèñïóñêàíèÿ.    Ïîêðàñíåíèå, îòåê èëè áîëü â îáëàñòè âåíû íà íîãå.    Ïðîáëåìû ñ êîðìëåíèåì ãðóäüþ èëè íàëè÷èå ïîêðàñíåíèÿ èëè áîëåçíåííîãî ìåñòà íà ìîëî÷íîé æåëåçå.    Áîëü, âîçíèêøàÿ â ðåçóëüòàòå ýïèçèîòîìèè èëè ðàçðûâà ïðîìåæíîñòè, êîòîðàÿ óñèëèâàåòñÿ èëè íå ïðîõîäèò.    Òîøíîòà è ðâîòà.    Áîëü â ãðóäè è êàøåëü, èëè âàì òðóäíî äûøàòü.    Íåñïîñîáíîñòü ïðåîäîëåòü ãðóñòü, òðåâîãó èëè äåïðåññèþ.   Åñëè ó âàñ âîçíèêëè îïàñåíèÿ, ÷òî âû ìîæåòå ïðè÷èíèòü âðåä ñåáå èëè ðåáåíêó, íåçàìåäëèòåëüíî ïîçâîíèòå âðà÷ó.     Ó âàñ ïîÿâèëèñü âîïðîñû ïîñëå òîãî, êàê âû âåðíóëèñü äîìîé èç áîëüíèöû.    ×àñòî ìîéòå ðóêè:  Ïåðåä òåì êàê äîòðàãèâàòüñÿ äî ñâîåé ïðîìåæíîñòè èëè øâîâ, âñåãäà ìîéòå ðóêè.  Ýòî ïîçâîëÿåò ñîêðàòèòü ðèñê ðàçâèòèÿ èíôåêöèè.  Åñëè ðóêè ó âàñ íå ãðÿçíûå, äëÿ ñîáëþäåíèÿ íàäëåæàùåé ãèãèåíû âû ìîæåòå ïðîòåðåòü èõ ñ ïîìîùüþ äåçèíôèöèðóþùåãî ñîñòàâà äëÿ ðóê íà ñïèðòîâîé  îñíîâå.  Êîðîòêî ïîäñòðèãàéòå íîãòè è óõàæèâàéòå çà íèìè.      Vaginal Delivery Discharge Instructions  Activity:     Ask family and friends for help when you need it.    Do not place anything in your vagina until your doctor approves.    Take it easy for the next few weeks to allow your body to recover. You may do any activities you feel up to at that point.    Do not drive while taking pain pills prescribed by your doctor. You may drive if taking over-the-counter pain pills.    Call your health care provider if you have any of these symptoms:    You soak a sanitary pad with blood within 1 hour, or you see blood clots larger than a golf ball.    Bleeding that lasts more than 6 weeks.    You have vaginal discharge that smells bad.     A fever of 100.4  F (38  C) or higher (temperature taken under your tongue), with or without chills     Severe, pain, cramping or tenderness in your lower belly area.    Increased pain, swelling, redness or fluid around your stitches.    A more frequent or urgent need to urinate (pee), or it burns when you pee.    Redness, swelling or pain around a vein in your leg.    Problems breastfeeding, or a red or painful area on your breast.    Pain that increases or does not go away from an episiotomy or perineal tear.    Nausea and vomiting.    Chest pain and cough or are gasping for air.    Problems coping with sadness, anxiety, or depression.     If you have any concerns about hurting yourself or the baby, call your doctor right away.     You have questions or concerns after you return home.    Keep your hands clean:  Always wash your hands before touching your perineal area and stitches.  This helps reduce your risk of infection.  If your hands aren t dirty, you may use an alcohol hand-rub to clean your hands. Keep your nails clean and short.      Pending Results     No orders found from 6/23/2017 to 6/26/2017.            Statement of Approval     Ordered          06/27/17 1014  I have  "reviewed and agree with all the recommendations and orders detailed in this document.  EFFECTIVE NOW     Approved and electronically signed by:  Debbi Granados MD             Admission Information     Date & Time Provider Department Dept. Phone    2017 Shoaib Dasilva MD [facility] [phone]      Your Vitals Were     Blood Pressure Pulse Temperature Respirations Height Weight    119/88 68 97.6  F (36.4  C) (Oral) 18 1.676 m (5' 6\") 84.4 kg (186 lb)    BMI (Body Mass Index)                   30.02 kg/m2           MyChart Information     [facility] lets you send messages to your doctor, view your test results, renew your prescriptions, schedule appointments and more. To sign up, go to [email or web address] . Click on \"Log in\" on the left side of the screen, which will take you to the Welcome page. Then click on \"Sign up Now\" on the right side of the page.     You will be asked to enter the access code listed below, as well as some personal information. Please follow the directions to create your username and password.     Your access code is: [email or web address]  Expires: 2017 12:36 PM     Your access code will  in 90 days. If you need help or a new code, please call your [place] clinic or [phone].        Care EveryWhere ID     This is your Care EveryWhere ID. This could be used by other organizations to access your [place] medical records  [id number]        Equal Access to Services     [facility] CenterLISANDRO : Hadii karmen ku hadasho Soomaali, waaxda luqadaha, qaybta kaalmada adeegyada, yair howard . So [facility] [phone].    ATENCIÓN: Si habla español, tiene a davila disposición servicios gratuitos de asistencia lingüística. Llame al [phone].    We comply with applicable federal civil rights laws and [place] laws. We do not discriminate on the basis of race, color, national origin, age, disability sex, sexual orientation or gender identity.             "   Review of your medicines      START taking        Dose / Directions    docusate sodium 100 MG tablet   Commonly known as:  COLACE   Used for:  Anemia due to blood loss, acute        Dose:  100 mg   Take 100 mg by mouth 2 times daily as needed for constipation   Quantity:  60 tablet   Refills:  1       ferrous sulfate 142 (45 FE) MG Tbcr   Commonly known as:  SLO-FE   Used for:  Anemia due to blood loss, acute        Dose:  142 mg   Take 1 tablet (142 mg) by mouth daily   Quantity:  30 tablet   Refills:  1       ibuprofen 800 MG tablet   Commonly known as:  ADVIL/MOTRIN        Dose:  800 mg   Take 1 tablet (800 mg) by mouth every 8 hours as needed for moderate pain   Quantity:  30 tablet   Refills:  1         CONTINUE these medicines which have NOT CHANGED        Dose / Directions    PRENATAL PLUS DHA PO        Take by mouth daily   Refills:  0            Where to get your medicines      Some of these will need a paper prescription and others can be bought over the counter. Ask your nurse if you have questions.     Bring a paper prescription for each of these medications     docusate sodium 100 MG tablet    ferrous sulfate 142 (45 FE) MG Tbcr    ibuprofen 800 MG tablet                Protect others around you: Learn how to safely use, store and throw away your medicines at www.disposemymeds.org.             Medication List: This is a list of all your medications and when to take them. Check marks below indicate your daily home schedule. Keep this list as a reference.      Medications           Morning Afternoon Evening Bedtime As Needed    docusate sodium 100 MG tablet   Commonly known as:  COLACE   Take 100 mg by mouth 2 times daily as needed for constipation                                ferrous sulfate 142 (45 FE) MG Tbcr   Commonly known as:  SLO-FE   Take 1 tablet (142 mg) by mouth daily                                ibuprofen 800 MG tablet   Commonly known as:  ADVIL/MOTRIN   Take 1 tablet (800 mg) by mouth  every 8 hours as needed for moderate pain   Last time this was given:  800 mg on 6/26/2017 10:20 PM                                PRENATAL PLUS DHA PO   Take by mouth daily

## 2017-06-25 NOTE — PLAN OF CARE
"Patient arrived to L&D for rule out labor.  38+3W gestation. History and physical obtained. External monitors applied. Toñito anywhere from 1-5min palpating mild. Patient coping, stating \"some are painful, some are not.\" SVE 2/100/-2 bulgy bag felt. Wanting natural labor. GBS negative. Will have patient walk for an hour. Recheck cervix and update MD.  "

## 2017-06-25 NOTE — IP AVS SNAPSHOT
[facility] Postpartum    [address]    [facility] [address]    Phone:  [phone]    Fax:  [phone]                                       After Visit Summary   6/25/2017    Linda Serrano    MRN: [id number]           After Visit Summary Signature Page     I have received my discharge instructions, and my questions have been answered. I have discussed any challenges I see with this plan with the nurse or doctor.    ..........................................................................................................................................  Patient/Patient Representative Signature      ..........................................................................................................................................  Patient Representative Print Name and Relationship to Patient    ..................................................               ................................................  Date                                            Time    ..........................................................................................................................................  Reviewed by Signature/Title    ...................................................              ..............................................  Date                                                            Time

## 2017-06-25 NOTE — DISCHARGE INSTRUCTIONS
Discharge Instruction for Undelivered Patients      You were seen for: Labor Assessment  We Consulted: Dr. Dasilva  You had (Test or Medicine):External fetal and uterine monitoring, sterile vaginal exams     Diet:   Drink 8 to 12 glasses of liquids (milk, juice, water) every day.  You may eat meals and snacks.     Activity:  Count fetal kicks everyday (see handout)  Call your doctor or nurse midwife if your baby is moving less than usual.     Call your provider if you notice:  Swelling in your face or increased swelling in your hands or legs.  Headaches that are not relieved by Tylenol (acetaminophen).  Changes in your vision (blurring: seeing spots or stars.)  Nausea (sick to your stomach) and vomiting (throwing up).   Weight gain of 5 pounds or more per week.  Heartburn that doesn't go away.  Signs of bladder infection: pain when you urinate (use the toilet), need to go more often and more urgently.  The bag of conroy (rupture of membranes) breaks, or you notice leaking in your underwear.  Bright red blood in your underwear.  Abdominal (lower belly) or stomach pain.  For first baby: Contractions (tightening) less than 5 minutes apart for one hour or more. (When you're breathing through them).  Increase or change in vaginal discharge (note the color and amount)      Follow-up:  As scheduled in the clinic

## 2017-06-25 NOTE — PROVIDER NOTIFICATION
06/25/17 1149   Provider Notification   Provider Name/Title Dr. Dasilva   Method of Notification Phone   Request Evaluate - Remote   Notification Reason Status Update   MD calling for an update if patient wants an . Patient was offered, but has declined. Does understand English. Planning to get patient back on the monitor and recheck cervix. Will text page MD MENA.

## 2017-06-25 NOTE — PROVIDER NOTIFICATION
06/25/17 1748   Provider Notification   Provider Name/Title Dr. Dasilva   Method of Notification Phone   Request Evaluate - Remote   Notification Reason Patient Arrived;Status Update;SVE   Dr. Dasilva updated on patient arrival, SVE unchanged, FHT's, and contraction pattern. MD stated to have patient walk for an hour and then recheck her cervix and update him.

## 2017-06-25 NOTE — PROVIDER NOTIFICATION
06/25/17 1218   Provider Notification   Provider Name/Title Dr. Dasilva   Method of Notification Phone   Request Evaluate - Remote   Notification Reason Status Update   Dr. Dasilva updated on patient not uncomfortable, not having to breathe through contractions. Ctx irregular every 1-5min. Reactive tracing. SVE unchanged after 3+hrs of ambulating. Discussed possibility of patient going home. Laboring at home. Coming back when uncomfortable or if membranes rupture. MD okay with patient going home until active labor kicks in. Text page MD what patient decides.

## 2017-06-25 NOTE — PROVIDER NOTIFICATION
06/25/17 0710   Provider Notification   Provider Name/Title Dr. Granados   Method of Notification In Department   Request Evaluate - Remote   Notification Reason Status Update   Dr. Granados updated on patient here for rule out labor. SVE not checked yet. This nurse will be getting report and taking over. Category 1 tracing reviewed. Patient may walk. Update Dr. Dasilva if after 0800

## 2017-06-25 NOTE — PROGRESS NOTES
Data: [facility] at 0616.   Reason for maternal/fetal assessment per patient is Rule Out Labor  . Patient is a . Prenatal record reviewed.      Obstetric History       T0      L0     SAB0   TAB0   Ectopic0   Multiple0   Live Births0       # Outcome Date GA Lbr Srinivasan/2nd Weight Sex Delivery Anes PTL Lv   1 Current                  Medical History:   Past Medical History:   Diagnosis Date     Palpitations    . Gestational Age 38w3d. VSS. Cervix: dilated to 3.  Fetal movement present. Patient denies  vaginal discharge, pelvic pressure, UTI symptoms, GI problems, bloody show, vaginal bleeding, edema, headache, visual disturbances, epigastric or URQ pain, abdominal pain, rupture of membranes. Support persons Hero is present.  Action: Verbal consent for EFM. Triage assessment completed. EFM applied for fetal well being. Uterine assessment completed. Fetal assessment: Presumed adequate fetal oxygenation documented (see flow record). Patient education pamphlets given on fetal movement counts and labor instructions. Patient instructed to report change in fetal movement, vaginal leaking of fluid or bleeding, abdominal pain, or any concerns related to the pregnancy to her nurse/physician.   Response: Dr. Dasilva informed of patient not changing cervix after monitoring for hours. Patient not uncomfortable. Plan per provider is patient may discharge to home. Come back when in active labor or if membranes rupture. Patient verbalized understanding of education and verbalized agreement with plan. Discharged ambulatory at 1245.

## 2017-06-25 NOTE — IP AVS SNAPSHOT
MRN:[id number]                      After Visit Summary   6/25/2017    Linda Serrano    MRN: [id number]           Thank you!     Thank you for choosing [facility] for your care. Our goal is always to provide you with excellent care. Hearing back from our patients is one way we can continue to improve our services. Please take a few minutes to complete the written survey that you may receive in the mail after you visit. If you would like to speak to someone directly about your visit please contact Patient Relations at [phone]. Thank you!          Patient Information     Date Of Birth          1990        About your hospital stay     You were admitted on:  June 25, 2017 You last received care in the:  [facility] Labor and Delivery    You were discharged on:  June 25, 2017       Who to Call     For medical emergencies, please call 911.  For non-urgent questions about your medical care, please call your primary care provider or clinic, [phone]          Attending Provider     Provider Specialty    Debbi Granados MD OB/Gyn    Shoaib Dasilva MD OB/Gyn       Primary Care Provider Office Phone # Fax #    Fam Phys MD Dianna [phone]      Further instructions from your care team       Discharge Instruction for Undelivered Patients      You were seen for: Labor Assessment  We Consulted: Dr. Dasilva  You had (Test or Medicine):External fetal and uterine monitoring, sterile vaginal exams     Diet:   Drink 8 to 12 glasses of liquids (milk, juice, water) every day.  You may eat meals and snacks.     Activity:  Count fetal kicks everyday (see handout)  Call your doctor or nurse midwife if your baby is moving less than usual.     Call your provider if you notice:  Swelling in your face or increased swelling in your hands or legs.  Headaches that are not relieved by Tylenol (acetaminophen).  Changes in your vision (blurring: seeing spots or stars.)  Nausea  "(sick to your stomach) and vomiting (throwing up).   Weight gain of 5 pounds or more per week.  Heartburn that doesn't go away.  Signs of bladder infection: pain when you urinate (use the toilet), need to go more often and more urgently.  The bag of conroy (rupture of membranes) breaks, or you notice leaking in your underwear.  Bright red blood in your underwear.  Abdominal (lower belly) or stomach pain.  For first baby: Contractions (tightening) less than 5 minutes apart for one hour or more. (When you're breathing through them).  Increase or change in vaginal discharge (note the color and amount)      Follow-up:  As scheduled in the clinic          Pending Results     Date and Time Order Name Status Description    2017 0906 Anti Treponema In process             Admission Information     Date & Time Provider Department Dept. Phone    2017 Shoaib Dasilva MD [facility] Labor and Delivery [phone]      Your Vitals Were     Blood Pressure Temperature Respirations Height Weight BMI (Body Mass Index)    126/82 97.4  F (36.3  C) (Oral) 18 1.68 m (5' 6.14\") 84.4 kg (186 lb) 29.89 kg/m2      MyChart Information     the[employer]tyone lets you send messages to your doctor, view your test results, renew your prescriptions, schedule appointments and more. To sign up, go to [email or web address] . Click on \"Log in\" on the left side of the screen, which will take you to the Welcome page. Then click on \"Sign up Now\" on the right side of the page.     You will be asked to enter the access code listed below, as well as some personal information. Please follow the directions to create your username and password.     Your access code is: [email or web address]  Expires: 2017 12:36 PM     Your access code will  in 90 days. If you need help or a new code, please call your [place] clinic or [phone].        Care EveryWhere ID     This is your Care EveryWhere ID. This could be used by other organizations to access your " [place] medical records  [id number]        Equal Access to Services     JYOTSNA DELGADO : Hadii karmen Huffman, wajuliana sherman, qaelisabet peña, yair lowry. So [facility] [phone].    ATENCIÓN: Si habla español, tiene a davila disposición servicios gratuitos de asistencia lingüística. Llame al [phone].    We comply with applicable federal civil rights laws and [place] laws. We do not discriminate on the basis of race, color, national origin, age, disability sex, sexual orientation or gender identity.               Review of your medicines      UNREVIEWED medicines. Ask your doctor about these medicines        Dose / Directions    PRENATAL PLUS DHA PO        Take by mouth daily   Refills:  0                Protect others around you: Learn how to safely use, store and throw away your medicines at www.disposemymeds.org.             Medication List: This is a list of all your medications and when to take them. Check marks below indicate your daily home schedule. Keep this list as a reference.      Medications           Morning Afternoon Evening Bedtime As Needed    PRENATAL PLUS DHA PO   Take by mouth daily

## 2017-06-25 NOTE — PLAN OF CARE
here at 38 3/7 weeks. Patient was discharged earlier today around noon after her cervix did not change and she was comfortable. Patient came in stating her pain has increased and she is feeling it in her lower back and vagina. Patient stated she was walking in the park and had to stop to breath through contractions. External monitors applied and explained. SVE unchanged 3/100/-2. FHT's with moderate variability, accels present, no decels. Toñito every 5+ minutes. Will update Dr. Dasilva.

## 2017-06-25 NOTE — H&P
Linda Serrano  [id number]  OB Admit History & Physical      HPI:  Ms. Serrano  is a 27 year old  @ 38w3d by LMP who presented to L&D for evaluation of spontaneous uterine activity and possible labor.      Prenatal course:  1st visit at 11 weeks, regular care, TWG 41#.    Pregnancy complications:  Algerian language, needs .  Initial low lying placenta, resolved at 28 weeks.  SVT, saw Cardiology normal echo, no follow up. Declined tdap.     Prenatal labs:  O positive, antibody screen negative,  Rubella  Immune, Hep B/HIV/RPR all negative, GC/CT negative, ,  GBS negative; genetic screening tests declined    Ultrasound at 11 weeks showed viable IUP, agreement with LMP EDC    20 week ultrasound normal, anterior low lying placenta.      Ultrasound at 28 weeks, placenta no longer low lying         OB history:   Obstetric History       T0      L0     SAB0   TAB0   Ectopic0   Multiple0   Live Births0       # Outcome Date GA Lbr Srinivasan/2nd Weight Sex Delivery Anes PTL Lv   1 Current                     PMHx:     Past Medical History:   Diagnosis Date     Palpitations        PSHX:  History reviewed. No pertinent surgical history.    Meds:    No current outpatient prescriptions on file.       Allergies: Review of patient's allergies indicates no known allergies.      REVIEW OF SYSTEMS:  Positives and negatives in HPI.     SocHx:    Social History     Social History     Marital status:      Spouse name: N/A     Number of children: N/A     Years of education: N/A     Occupational History     Not on file.     Social History Main Topics     Smoking status: Never Smoker     Smokeless tobacco: Not on file     Alcohol use No     Drug use: No     Sexual activity: Not on file     Other Topics Concern     Caffeine Concern Yes     tea or coffee 2-3 per day     Special Diet No     Exercise No     Social History Narrative        Fam Hx:    Family History   Problem Relation Age of Onset     Coronary  "Artery Disease No family hx of          PHYSICAL EXAM:      Vitals:  /82  Temp 97.4  F (36.3  C) (Oral)  Resp 18  Ht 1.68 m (5' 6.14\")  Wt 84.4 kg (186 lb)  BMI 29.89 kg/m2  Alert Awake in NAD  ABD gravid, non-tender, EFW 7-7.5#  Cervix:  3 cm / 100 % effaced at -2 station, membranes are intact  EFM:  Baseline 12, with moderate variability, accels are present, no decels  [place]: contractions q1.5-4 min    Assessment:  IUP at 38w3d admitted for evaluation of spontaneous uterine activity and possible labor.  After ambulation and observation, the patients cervix changed from 2 to 3 cm.  She is now admitted for monitoring and management of active labor. GBS negative.    Plan:  Admission            Continuous fetal and uterine monitoring            Analgesia            The plan of care was discussed with the patient and her partner.  They expressed understanding and agreement.             Anticipate              Mauritian  as needed.     Shoaib Dasilva MD  Dept of OB/GYN  2017   "

## 2017-06-25 NOTE — PROVIDER NOTIFICATION
06/25/17 0858   Provider Notification   Provider Name/Title Dr. Dasilva   Method of Notification Electronic Page   Request Evaluate - Remote   Notification Reason Status Update;SVE;Uterine Activity   Dr. Dasilva updated on patient here for rule out labor. SVE changed after walking. Toñito every 2-5min. Breathing through ctx well. Wanting a natural labor. Plan to admit to inpatient. Update MD when patient getting uncomfortable or if wanting something for pain.

## 2017-06-25 NOTE — IP AVS SNAPSHOT
MRN:[id number]                      After Visit Summary   6/25/2017    Linda Serrano    MRN: [id number]           Thank you!     Thank you for choosing [facility] for your care. Our goal is always to provide you with excellent care. Hearing back from our patients is one way we can continue to improve our services. Please take a few minutes to complete the written survey that you may receive in the mail after you visit. If you would like to speak to someone directly about your visit please contact Patient Relations at [phone]. Thank you!          Patient Information     Date Of Birth          1990        About your hospital stay     You were admitted on:  June 25, 2017 You last received care in the:  [facility] Labor and Delivery    You were discharged on:  June 25, 2017       Who to Call     For medical emergencies, please call 911.  For non-urgent questions about your medical care, please call your primary care provider or clinic, [phone]          Attending Provider     Provider Specialty    Shoaib Dasilva MD OB/Gyn       Primary Care Provider Office Phone # Fax #    Fam Phys MD Dianna [phone]      Further instructions from your care team       Discharge Instruction for Undelivered Patients      You were seen for: Labor Assessment  We Consulted: Dr. Dasilva  You had (Test or Medicine):fetal monitoring, unchanged exam, vistaril     Diet:   Drink 8 to 12 glasses of liquids (milk, juice, water) every day.  You may eat meals and snacks.     Activity:  Count fetal kicks everyday (see handout)  Call your doctor or nurse midwife if your baby is moving less than usual.     Call your provider if you notice:  Swelling in your face or increased swelling in your hands or legs.  Headaches that are not relieved by Tylenol (acetaminophen).  Changes in your vision (blurring: seeing spots or stars.)  Nausea (sick to your stomach) and vomiting (throwing  "up).   Weight gain of 5 pounds or more per week.  Heartburn that doesn't go away.  Signs of bladder infection: pain when you urinate (use the toilet), need to go more often and more urgently.  The bag of conroy (rupture of membranes) breaks, or you notice leaking in your underwear.  Bright red blood in your underwear.  Abdominal (lower belly) or stomach pain.  For first baby: Contractions (tightening) less than 5 minutes apart for one hour or more.  Increase or change in vaginal discharge (note the color and amount)  Other: Rest, use tylenol for pain    Follow-up:  As scheduled in the clinic          Pending Results     Date and Time Order Name Status Description    2017 0906 Anti Treponema In process             Admission Information     Date & Time Provider Department Dept. Phone    2017 Shoaib Dasilva MD [facility] Labor and Delivery [phone]      Your Vitals Were     Blood Pressure Temperature Respirations             134/89 97.6  F (36.4  C) (Oral) 16         MyChart Information     [employer] lets you send messages to your doctor, view your test results, renew your prescriptions, schedule appointments and more. To sign up, go to [email or web address] . Click on \"Log in\" on the left side of the screen, which will take you to the Welcome page. Then click on \"Sign up Now\" on the right side of the page.     You will be asked to enter the access code listed below, as well as some personal information. Please follow the directions to create your username and password.     Your access code is: [email or web address]  Expires: 2017 12:36 PM     Your access code will  in 90 days. If you need help or a new code, please call your [place] clinic or [phone].        Care EveryWhere ID     This is your Care EveryWhere ID. This could be used by other organizations to access your [place] medical records  [id number]        Equal Access to Services     WON DELGADO AH: Meeta Huffman, " pabloda whit, qaybta kaalmada usamasarah, yair amyin hayaasabino forresterrobin loganmariola laNguyenaasabino ah. So [facility] [phone].    ATENCIÓN: Si habla aldoañol, tiene a davila disposición servicios gratuitos de asistencia lingüística. Llame al [phone].    We comply with applicable federal civil rights laws and [place] laws. We do not discriminate on the basis of race, color, national origin, age, disability sex, sexual orientation or gender identity.               Review of your medicines      UNREVIEWED medicines. Ask your doctor about these medicines        Dose / Directions    PRENATAL PLUS DHA PO        Take by mouth daily   Refills:  0                Protect others around you: Learn how to safely use, store and throw away your medicines at www.disposemymeds.org.             Medication List: This is a list of all your medications and when to take them. Check marks below indicate your daily home schedule. Keep this list as a reference.      Medications           Morning Afternoon Evening Bedtime As Needed    PRENATAL PLUS DHA PO   Take by mouth daily

## 2017-06-26 LAB — T PALLIDUM IGG+IGM SER QL: NEGATIVE

## 2017-06-26 PROCEDURE — 25000125 ZZHC RX 250: Performed by: OBSTETRICS & GYNECOLOGY

## 2017-06-26 PROCEDURE — 0KQM0ZZ REPAIR PERINEUM MUSCLE, OPEN APPROACH: ICD-10-PCS | Performed by: OBSTETRICS & GYNECOLOGY

## 2017-06-26 PROCEDURE — 0HQ9XZZ REPAIR PERINEUM SKIN, EXTERNAL APPROACH: ICD-10-PCS | Performed by: OBSTETRICS & GYNECOLOGY

## 2017-06-26 PROCEDURE — 12000029 ZZH R&B OB INTERMEDIATE

## 2017-06-26 PROCEDURE — 72200001 ZZH LABOR CARE VAGINAL DELIVERY SINGLE

## 2017-06-26 PROCEDURE — 25000132 ZZH RX MED GY IP 250 OP 250 PS 637: Performed by: OBSTETRICS & GYNECOLOGY

## 2017-06-26 RX ORDER — IBUPROFEN 400 MG/1
400-800 TABLET, FILM COATED ORAL EVERY 6 HOURS PRN
Status: DISCONTINUED | OUTPATIENT
Start: 2017-06-26 | End: 2017-06-27 | Stop reason: HOSPADM

## 2017-06-26 RX ORDER — HYDROCORTISONE 2.5 %
CREAM (GRAM) TOPICAL 3 TIMES DAILY PRN
Status: DISCONTINUED | OUTPATIENT
Start: 2017-06-26 | End: 2017-06-27 | Stop reason: HOSPADM

## 2017-06-26 RX ORDER — ACETAMINOPHEN 325 MG/1
650 TABLET ORAL EVERY 4 HOURS PRN
Status: DISCONTINUED | OUTPATIENT
Start: 2017-06-26 | End: 2017-06-27 | Stop reason: HOSPADM

## 2017-06-26 RX ORDER — AMOXICILLIN 250 MG
1-2 CAPSULE ORAL 2 TIMES DAILY
Status: DISCONTINUED | OUTPATIENT
Start: 2017-06-26 | End: 2017-06-27 | Stop reason: HOSPADM

## 2017-06-26 RX ORDER — LANOLIN 100 %
OINTMENT (GRAM) TOPICAL
Status: DISCONTINUED | OUTPATIENT
Start: 2017-06-26 | End: 2017-06-27 | Stop reason: HOSPADM

## 2017-06-26 RX ORDER — MISOPROSTOL 200 UG/1
400 TABLET ORAL
Status: DISCONTINUED | OUTPATIENT
Start: 2017-06-26 | End: 2017-06-27 | Stop reason: HOSPADM

## 2017-06-26 RX ORDER — OXYTOCIN 10 [USP'U]/ML
10 INJECTION, SOLUTION INTRAMUSCULAR; INTRAVENOUS
Status: DISCONTINUED | OUTPATIENT
Start: 2017-06-26 | End: 2017-06-27 | Stop reason: HOSPADM

## 2017-06-26 RX ORDER — OXYTOCIN/0.9 % SODIUM CHLORIDE 30/500 ML
1-24 PLASTIC BAG, INJECTION (ML) INTRAVENOUS CONTINUOUS
Status: DISCONTINUED | OUTPATIENT
Start: 2017-06-26 | End: 2017-06-26

## 2017-06-26 RX ORDER — NALOXONE HYDROCHLORIDE 0.4 MG/ML
.1-.4 INJECTION, SOLUTION INTRAMUSCULAR; INTRAVENOUS; SUBCUTANEOUS
Status: DISCONTINUED | OUTPATIENT
Start: 2017-06-26 | End: 2017-06-27 | Stop reason: HOSPADM

## 2017-06-26 RX ORDER — OXYTOCIN/0.9 % SODIUM CHLORIDE 30/500 ML
100 PLASTIC BAG, INJECTION (ML) INTRAVENOUS CONTINUOUS
Status: ACTIVE | OUTPATIENT
Start: 2017-06-26 | End: 2017-06-26

## 2017-06-26 RX ORDER — BISACODYL 10 MG
10 SUPPOSITORY, RECTAL RECTAL DAILY PRN
Status: DISCONTINUED | OUTPATIENT
Start: 2017-06-28 | End: 2017-06-27 | Stop reason: HOSPADM

## 2017-06-26 RX ORDER — OXYTOCIN/0.9 % SODIUM CHLORIDE 30/500 ML
340 PLASTIC BAG, INJECTION (ML) INTRAVENOUS CONTINUOUS PRN
Status: DISCONTINUED | OUTPATIENT
Start: 2017-06-26 | End: 2017-06-27 | Stop reason: HOSPADM

## 2017-06-26 RX ADMIN — OXYTOCIN-SODIUM CHLORIDE 0.9% IV SOLN 30 UNIT/500ML 1 MILLI-UNITS/MIN: 30-0.9/5 SOLUTION at 02:13

## 2017-06-26 RX ADMIN — IBUPROFEN 800 MG: 800 TABLET ORAL at 14:40

## 2017-06-26 RX ADMIN — IBUPROFEN 800 MG: 800 TABLET ORAL at 08:27

## 2017-06-26 RX ADMIN — SENNOSIDES AND DOCUSATE SODIUM 1 TABLET: 8.6; 5 TABLET ORAL at 09:27

## 2017-06-26 RX ADMIN — SENNOSIDES AND DOCUSATE SODIUM 1 TABLET: 8.6; 5 TABLET ORAL at 22:20

## 2017-06-26 RX ADMIN — IBUPROFEN 800 MG: 800 TABLET ORAL at 22:20

## 2017-06-26 NOTE — PROVIDER NOTIFICATION
06/26/17 0035   Provider Notification   Provider Name/Title Dr. Dasilva   Method of Notification Phone   Request Evaluate - Remote   Notification Reason Status Update   Dr. Dasilva called for updated, notified of last SVE of 6/100/-2 with SROM for large amount of clear fluid.  Updated Dr. Dasilva that UC's have slowed to q8-10 in last thirty minutes.  Dr. Dasilva verbalized understanding, TORB for oxytocin augmentation if no cervical change in the next 1-2 hours.  Will update pt on POC and continue to monitor.

## 2017-06-26 NOTE — ANESTHESIA PROCEDURE NOTES
Peripheral nerve/Neuraxial procedure note : epidural catheter  Pre-Procedure  Performed by JAQUAN CANNON  Referred by ASHVIN  Location: OB    Procedure Times:6/25/2017 10:58 PM and 6/25/2017 10:14 PM  Pre-Anesthestic Checklist: patient identified, IV checked, risks and benefits discussed, informed consent, monitors and equipment checked, pre-op evaluation and at physician/surgeon's request    Timeout  Correct Patient: Yes   Correct Procedure: Yes   Correct Site: Yes   Correct Laterality: N/A   Correct Position: Yes   Site Marked: N/A   .   Procedure Documentation    .    Procedure:    Epidural catheter.  Insertion Site:L2-3  (midline approach) Injection technique: LORT saline   Local skin infiltrated with mL of 1% lidocaine.  BRIANDA at 4 cm     Patient Prep;mask, sterile gloves, povidone-iodine 7.5% surgical scrub, patient draped.  .  Needle: Touhy needle Needle Gauge: 17.    Needle Length (Inches) 3.5  # of attempts: 1 and # of redirects:  .   Catheter: 19 G . .  Catheter threaded easily  6 cm epidural space.  10 cm at skin.   .    Assessment/Narrative  Paresthesias: No.  .  .  Aspiration negative for heme or CSF  . Test dose of 3 mL lidocaine 1.5% w/ 1:200,000 epinephrine at 23:10.  Test dose negative for signs of intravascular, subdural or intrathecal injection.

## 2017-06-26 NOTE — PROGRESS NOTES
Data: Patient [facility] at 1725.   Reason for maternal/fetal assessment per patient is Rule Out Labor  . Patient is a . Prenatal record reviewed.      Obstetric History       T0      L0     SAB0   TAB0   Ectopic0   Multiple0   Live Births0       # Outcome Date GA Lbr Srinivasan/2nd Weight Sex Delivery Anes PTL Lv   1 Current                  Medical History:   Past Medical History:   Diagnosis Date     Palpitations    . Gestational Age 38w3d. VSS. Cervix: dilated to 3.  Fetal movement present. Patient denies vaginal discharge, pelvic pressure, UTI symptoms, GI problems, bloody show, vaginal bleeding, edema, headache, visual disturbances, epigastric or URQ pain, abdominal pain, rupture of membranes. Support persons Hero present.  Action: Verbal consent for EFM. Triage assessment completed. EFM applied for fetal wellbeing. Uterine assessment showing contractions every 5+ minutes, palpating mild. Fetal assessment: Presumed adequate fetal oxygenation documented (see flow record). Patient instructed to report change in fetal movement, vaginal leaking of fluid or bleeding, abdominal pain, or any concerns related to the pregnancy to her nurse/physician.   Response: Dr. Dasilva informed of unchanged SVE. Plan per provider is discharge home. Patient verbalized understanding of education and verbalized agreement with plan. Discharged ambulatory at 1914.

## 2017-06-26 NOTE — PLAN OF CARE
Pt transferred to room 405 without complication.  Oriented to room and call light, discussed POC with pt and spouse and they verbalized understanding at this time.  Pt planning on epidural for pain management, will work on IV placement and prep pt for epidural.  Pt denies further needs/concerns at this time.  Will continue to monitor.

## 2017-06-26 NOTE — PLAN OF CARE
Data: Linda Serrano transferred to 442 via wheelchair at 0815. Baby transferred via parent's arms.  Action: Receiving unit notified of transfer: Yes. Patient and family notified of room change. Report given to Theresa KOWALSKI RN at 0830. Belongings sent to receiving unit. Accompanied by Registered Nurse. Oriented patient to surroundings. Call light within reach. ID bands double-checked with receiving RN.  Response: Patient tolerated transfer and is stable.

## 2017-06-26 NOTE — PROVIDER NOTIFICATION
06/26/17 0415   Provider Notification   Provider Name/Title Dr. Dasilva   Method of Notification Phone   Request Evaluate - Remote   Notification Reason SVE   Dr. Dasilva updated on SVE of 10/100/+1, pt comfortable with epidural, no urge to push or feeling any pressure.  FHR Cat. I with baseline of 115-120bpm.  Dr. Dasilva verbalized understanding, ordered to labor down for one hour and then begin pushing with pt.  Dr. Dasilva is at [facility] and is available for delivery when needed.  Will update pt on POC and continue to monitor.

## 2017-06-26 NOTE — ANESTHESIA PREPROCEDURE EVALUATION
PAC NOTE:       ANESTHESIA PRE EVALUATION:  Anesthesia Evaluation       history and physical reviewed .      No history of anesthetic complications          ROS/MED HX    ENT/Pulmonary:  - neg pulmonary ROS     Neurologic:  - neg neurologic ROS     Cardiovascular:  - neg cardiovascular ROS       METS/Exercise Tolerance:     Hematologic:         Musculoskeletal:         GI/Hepatic:  - neg GI/hepatic ROS       Renal/Genitourinary:         Endo:         Psychiatric:         Infectious Disease:         Malignancy:         Other:                     Physical Exam  Normal systems: cardiovascular, pulmonary and dental    Airway   Mallampati: II  TM distance: > 3 FB  Neck ROM: full  Mouth opening: > 3 cm    Dental     Cardiovascular       Pulmonary         neg OB ROS            Anesthesia Plan      History & Physical Review      ASA Status:  .  OB Epidural Asa: 2            Postoperative Care      Consents  Anesthetic plan, risks, benefits and alternatives discussed with:  Patient..                            .

## 2017-06-26 NOTE — DISCHARGE INSTRUCTIONS
Discharge Instruction for Undelivered Patients      You were seen for: Labor Assessment  We Consulted: Dr. Dasilva  You had (Test or Medicine):fetal monitoring, unchanged exam, vistaril     Diet:   Drink 8 to 12 glasses of liquids (milk, juice, water) every day.  You may eat meals and snacks.     Activity:  Count fetal kicks everyday (see handout)  Call your doctor or nurse midwife if your baby is moving less than usual.     Call your provider if you notice:  Swelling in your face or increased swelling in your hands or legs.  Headaches that are not relieved by Tylenol (acetaminophen).  Changes in your vision (blurring: seeing spots or stars.)  Nausea (sick to your stomach) and vomiting (throwing up).   Weight gain of 5 pounds or more per week.  Heartburn that doesn't go away.  Signs of bladder infection: pain when you urinate (use the toilet), need to go more often and more urgently.  The bag of conroy (rupture of membranes) breaks, or you notice leaking in your underwear.  Bright red blood in your underwear.  Abdominal (lower belly) or stomach pain.  For first baby: Contractions (tightening) less than 5 minutes apart for one hour or more.  Increase or change in vaginal discharge (note the color and amount)  Other: Rest, use tylenol for pain    Follow-up:  As scheduled in the clinic

## 2017-06-26 NOTE — PROGRESS NOTES
"OB Progress Note  2017  10:24 PM    S:  Patient returns with more painful regular contractions after being discharge undelivered twice today for ineffective contractions/prolonged prodromal labor.  She had planned an unmedicated labor, but is now considering an epidural.  Her contractions are stronger and more frequent.       O:  BP (!) 133/91  Temp 98.7  F (37.1  C) (Oral)  Resp 20  Ht 1.676 m (5' 6\")  Wt 84.4 kg (186 lb)  BMI 30.02 kg/m2  EFM: just placed  [place]:  Ctx q2-3 min  SVE:  4-5/100%/vertex -2  Membranes: are intact      A/P:  27 year old  @38w3d returns after being discharged undelivered twice today, now in definite labor with significant cervical change.  GBS negative.  Satisfactory fetal status per RN report, Category I tracing.     1.  Routine cares  2.  Admit  3.  OK for epidural analgesia  4.  Continuous fetal monitoring  5.  Anticipate   6.  Plan of care was reviewed with the patient by the RN in attendance.  The patient expressed understanding and agreement.  She does not require nor does she request an .      Shoaib Dasilva    "

## 2017-06-26 NOTE — L&D DELIVERY NOTE
ADMISSION DIAGNOSES:  Intrauterine pregnancy 38 weeks 4 days gestation, prolonged prodromal labor, spontaneous active labor, spontaneous rupture of membranes, group B strep negative.      POSTPARTUM DIAGNOSES:  Status post Pitocin-augmented normal spontaneous vaginal delivery, infant male, 8 pounds 5 ounces, Apgars 9 and 9, repair of vaginal and perineal lacerations.      PATIENT IDENTIFICATION:  Linda Serrano is a 27-year-old  woman,  1, para 0 at 38 weeks 4 days gestation who is admitted to Labor and Delivery at [facility] for evaluation of spontaneous active labor.  The patient was followed at our office since 11 weeks gestation.  Her prenatal care was complicated by initially a low-lying placenta, but this resolved by 28 weeks' gestation.  She was also diagnosed with supraventricular tachycardia.  She was seen by Cardiology and had a normal echo.  No followup was recommended.  The patient declined a T-Dap.  She spoke Bhutanese, but did not request an  as she had excellent understanding and communication in English.  Her blood type O positive, antibody screen negative, rubella titer showed immunity, hepatitis B, HIV, RPR were all negative, chlamydia and gonorrhea DNA probe were negative.  One-hour glucose screen was 136, GBS was negative at term, and genetic screening tests were declined.  An ultrasound at 11 weeks showed a viable intrauterine pregnancy and agreed with the last menstrual period due date.  A 20-week ultrasound was normal other than the low-lying placenta.  A repeat ultrasound at 28 weeks showed that the low-lying placenta had resolved.  The patient contacted our office early in the morning of 2017 reporting spontaneous uterine contractions.  She was evaluated initially in the maternal assessment area at [facility] where she was 2 cm dilated.  Fetal heart tones were category 1.  The patient was having contractions between 1 and 5 minutes  apart, some were uncomfortable and some were not perceivable.  The patient ambulated, was observed and she was reexamined.  The cervix had changed to 3 cm dilation.  The plan was to admit the patient for the suspicion of spontaneous active labor.  The patient continued to contract, albeit somewhat irregularly and not consistently painful.  She ambulated and was reexamined.  Her cervix was unchanged.  It remained 3 cm.  When her contractions began to space out and were less frequent, a discussion was held regarding the plan of care.  It was elected that the patient would be discharged to return when she was in active labor.  The patient did return later in the evening at approximately 05:45 reporting increasing uterine activity.  She was reexamined and her cervix was unchanged.  Fetal heart tones remained category 1.  She again ambulated, was observed for a period of time, and when it was apparent that she was still not in active labor, the patient was discharged home with a prescription for Vistaril 100 mg to take orally for rest.  After a period of time at home, the patient's contractions increased in frequency and intensity.  She returned at 10:05 p.m. on 06/25/2017 with contractions every 2-4 minutes and stronger.  Her cervix was 4-5 cm dilated, 100% effaced, vertex -2; this represented a significant change from her previous exam.  The patient was admitted for evaluation of spontaneous labor.  Initially, the patient had planned an unmedicated delivery.  However, with the sequence of events above and increasing discomfort, the patient did elect to have an epidural.  Fetal heart tones were category 1.  The patient was afebrile and her membranes were intact.  Her group B strep status was negative.  The patient received an epidural anesthetic at 11:15 p.m. on 06/25/2017, this provided excellent relief.  Spontaneous rupture of membranes occurred at 00:10 on 06/26/2017.  The amniotic fluid was clear.  The patient was  examined and she was 6 cm dilated, 100% effaced, vertex -2.  She was afebrile, the fetal heart rate tracing was category 1 and contractions were every 6-7 minutes.  When she was unchanged 1 hour later, intravenous oxytocin augmentation of labor was begun.  The patient had excellent relief of pain from the epidural.  The Pitocin was increased to a maximum of 2 milliunits per minute.  She was reexamined at 04:05 and the cervix was completely dilated and completely effaced.  After a period of observation, the patient was reevaluated and the vertex was at a +3 station.  Maternal expulsive efforts were begun.      SECOND STAGE OF LABOR:  The patient was completely dilated by 04:05 on 06/26/2017.  The plan was to labor down, but when she was reassessed approximately 30 minutes later, the vertex was at a +3 station.  Fetal heart tones were category 1.  Preparations were then made to begin maternal expulsive efforts.  The patient pushed effectively and brought the baby to a full crown.  I had arrived at the hospital when the patient had begun pushing.  Fetal heart tones remained satisfactory.  The patient pushed and brought the baby to a full crown.  I was called to attend the delivery.  No episiotomy was made.  At 05:32 on 06/26/2017 the patient delivered an infant male in the TARAH position.  The nose and mouth were bulb suctioned, a single nuchal cord was clamped and cut on the perineum.  The remainder of the baby was delivered without shoulder dystocia.  The baby was immediately placed on the mother's abdomen.  The product of the pregnancy was an infant male, 8 pounds 5 ounces, Apgars 9 and 9 at 1 and 5 minutes respectively.  No resuscitation was necessary, there was no birth trauma, no congenital anomalies were noted.      THIRD STAGE OF LABOR:  After a 5-minute third stage of labor, the placenta delivered spontaneously intact with a 3-vessel cord.  Examination of the perineum found second-degree vaginal as well as  perineal/labial lacerations.  There were bilateral vaginal lacerations just inside the introitus, as well as avulsion of the vaginal mucosa from the mucosal surface of the labia minora normal.  The second-degree vaginal lacerations on each side were repaired with 3-0 Vicryl and avulsed vaginal mucosa was reapproximated to the perineal skin at the introitus.  The repair was satisfactory.  The patient tolerated the procedure well.  All sponge and needle counts were correct at the conclusion of the delivery.  The baby remained with the patient and her  as well as the rest of the family in the birthing room.  There were no apparent complications.  The blood loss was 150 cc.  Uterine tone was excellent as Pitocin was infusing.      DELIVERY SUMMARY:  Pregnancy 38+ weeks gestation, prolonged prodromal labor.  Spontaneous active labor.  Spontaneous rupture of membranes.  Pitocin-augmented normal spontaneous vaginal delivery, infant male, 8 pounds 5 ounces, Apgars 9 and 9.  Placental passage intact.  Repair of bilateral second-degree vaginal lacerations as well as an avulsion of the vaginal mucosa from the perineal tissues.  Group B strep negative.  Satisfactory maternal and fetal status post delivery.         JANN LOCK MD             D: 2017 07:20   T: 2017 09:12   MT: SEE#[id number]      Name:     IRAJ MULLINS   MRN:      [id number]        Account:        [id number]   :      1990           Delivery Date:  2017      Document: [id number]       cc: [facility]

## 2017-06-26 NOTE — PROGRESS NOTES
"OB Progress Note  2017  4:58 AM    S:  Pt complete and feeling significant pelvic floor pressure.  She was checked and the baby was at +3 station.   No other complaints.      O:  /67  Temp 98.5  F (36.9  C) (Oral)  Resp 16  Ht 1.676 m (5' 6\")  Wt 84.4 kg (186 lb)  BMI 30.02 kg/m2  EFM: baseline 120, accelerations are present, no decelerations, moderate variability; Category I  Donaldson:  Ctx q2-3 min  SVE:  10/100%/+3 vertex  Membranes: SROM at 0010 hours 17    Pitocin at 2 mU/min    A/P:  27 year old  @38w4d now ready to begin pushing      1.  Continuous fetal monitoring  2.  Start pushing  3.  Anticipate  shortly  4.  Plan of care discussed with the patient and family.    Shoaib Dasilva    "

## 2017-06-26 NOTE — PLAN OF CARE
Problem: Goal Outcome Summary  Goal: Goal Outcome Summary  Outcome: Improving  Meeting goals for shift, see flow sheet. Up and steady on feet, able to void. Perineal swelling the same since arrival, tender to touch but not hard. Tucks and ice to perineum. Denies pain. Spouse at bedside and supportive. Anticitpate D/C PPD 2.

## 2017-06-26 NOTE — PROVIDER NOTIFICATION
06/25/17 1306   Provider Notification   Provider Name/Title Dr. Dasilva   Method of Notification Phone   Request Evaluate - Remote   Notification Reason Patient Arrived   Dr. Dasilva updated on pt arrival, SVE of 4.5/100/-2 compared to SVE of 3/100/-2 at 1900.  UC's q2-4 min, FHR Cat. I, pt GBS negative.  Dr. Dasilva verbalized understanding, provided intrapartum orders.  Updated MD that pt is considering epidural placement, MD verbalized understanding and stated she could have one at any time.  Will update pt on POC and continue to monitor.

## 2017-06-27 VITALS
DIASTOLIC BLOOD PRESSURE: 88 MMHG | WEIGHT: 186 LBS | RESPIRATION RATE: 18 BRPM | SYSTOLIC BLOOD PRESSURE: 119 MMHG | HEART RATE: 68 BPM | HEIGHT: 66 IN | TEMPERATURE: 97.6 F | BODY MASS INDEX: 29.89 KG/M2

## 2017-06-27 LAB — HGB BLD-MCNC: 9.2 G/DL (ref 11.7–15.7)

## 2017-06-27 PROCEDURE — 40000084 ZZH STATISTIC IP LACTATION SERVICES 16-30 MIN

## 2017-06-27 PROCEDURE — 36415 COLL VENOUS BLD VENIPUNCTURE: CPT | Performed by: OBSTETRICS & GYNECOLOGY

## 2017-06-27 PROCEDURE — 85018 HEMOGLOBIN: CPT | Performed by: OBSTETRICS & GYNECOLOGY

## 2017-06-27 RX ORDER — IBUPROFEN 800 MG/1
800 TABLET, FILM COATED ORAL EVERY 8 HOURS PRN
Qty: 30 TABLET | Refills: 1 | Status: ON HOLD | OUTPATIENT
Start: 2017-06-27 | End: 2017-09-06

## 2017-06-27 RX ORDER — ASPIRIN 81 MG
100 TABLET, DELAYED RELEASE (ENTERIC COATED) ORAL 2 TIMES DAILY PRN
Qty: 60 TABLET | Refills: 1 | Status: ON HOLD | OUTPATIENT
Start: 2017-06-27 | End: 2017-09-06

## 2017-06-27 NOTE — PLAN OF CARE
Problem: Goal Outcome Summary  Goal: Goal Outcome Summary  Stable patient meeting expected goals. All VS stable. Denies pain, declines medication. Using abdominal binder for comfort. Breastfeeding infant without staff assistance, observed latch score of 8. Independent with self and  cares.

## 2017-06-27 NOTE — DISCHARGE INSTRUCTIONS
Vaginal Delivery Discharge Instructions: Russian  Follow up in  6 weeks  Lactation [phone]  [facility] [phone]  Ôèçè÷åñêàÿ íàãðóçêà:     Åñëè âàì íåîáõîäèìà ïîìîùü, ïîïðîñèòå î íåé ñâîèõ ðîäñòâåííèêîâ è äðóçåé.    Íå ââîäèòå âî âëàãàëèùå íè÷åãî, ïîêà íå ïîëó÷èòå íà ýòî ðàçðåøåíèå âðà÷à.    Ïîñòàðàéòåñü îòêàçàòüñÿ îò ôèçè÷åñêîé íàãðóçêè â òå÷åíèå íåñêîëüêèõ íåäåëü, ÷òîáû ïîçâîëèòü îðãàíèçìó âîññòàíîâèòüñÿ. Âû ìîæåòå çàíèìàòüñÿ ëþáîé äåÿòåëüíîñòüþ, íî íå ïåðåóñåðäñòâóéòå.    Íå ñàäèòåñü çà ðóëü àâòîìîáèëÿ, åñëè âû ïðèíèìàåòå áîëåóòîëÿþùèå ïðåïàðàòû, âûïèñàííûå âðà÷îì. Âîæäåíèå àâòîìîáèëÿ ðàçðåøàåòñÿ, åñëè áîëåóòîëÿþùèå ëåêàðñòâà, êîòîðûå âû ïðèíèìàåòå, ìîæíî êóïèòü áåç ðåöåïòà.    Ïîçâîíèòå âàøåìó ïîñòàâùèêó ìåäèöèíñêèõ óñëóã, åñëè ó âàñ íàáëþäàåòñÿ ëþáîé èç ñëåäóþùèõ ñèìïòîìîâ:    Ãèãèåíè÷åñêàÿ ïðîêëàäêà íàñêâîçü ïðîìîêëà îò êðîâè â òå÷åíèå îäíîãî ÷àñà èëè âû çàìåòèëè ñãóñòêè êðîâè ðàçìåðîì áîëüøå, ÷åì ìÿ÷ äëÿ èãðû â ãîëüô.    Êðîâîòå÷åíèå íå ïðåêðàùàåòñÿ â òå÷åíèå áîëåå ÷åì 6 íåäåëü.    Ó âàñ èìåþòñÿ âëàãàëèùíûå âûäåëåíèÿ ñ íåïðèÿòíûì çàïàõîì.     Òåìïåðàòóðà 100,4  F (38  C) èëè âûøå (ïðè èçìåðåíèè òåìïåðàòóðû ïîä ÿçûêîì), ñîïðîâîæäàþùàÿñÿ îçíîáîì èëè áåç íåãî.     Ñèëüíàÿ áîëü, ñõâàòêîîáðàçíàÿ áîëü èëè áîëåçíåííûå îùóùåíèÿ âíèçó æèâîòà.    Óñèëåíèå áîëè, îòå÷íîñòü, ïîêðàñíåíèå èëè ñêîïëåíèå æèäêîñòè â ìåñòå íàëîæåíèÿ øâîâ.    Æåëàíèå ìî÷èòüñÿ áîëåå ÷àñòî è íåñïîñîáíîñòü äîëãî òåðïåòü èëè îùóùåíèå ææåíèÿ âî âðåìÿ ìî÷åèñïóñêàíèÿ.    Ïîêðàñíåíèå, îòåê èëè áîëü â îáëàñòè âåíû íà íîãå.    Ïðîáëåìû ñ êîðìëåíèåì ãðóäüþ èëè íàëè÷èå ïîêðàñíåíèÿ èëè áîëåçíåííîãî ìåñòà íà ìîëî÷íîé æåëåçå.    Áîëü, âîçíèêøàÿ â ðåçóëüòàòå ýïèçèîòîìèè èëè ðàçðûâà ïðîìåæíîñòè, êîòîðàÿ óñèëèâàåòñÿ èëè íå ïðîõîäèò.    Òîøíîòà è ðâîòà.    Áîëü â ãðóäè è êàøåëü, èëè âàì òðóäíî äûøàòü.    Íåñïîñîáíîñòü ïðåîäîëåòü ãðóñòü, òðåâîãó èëè äåïðåññèþ.   Åñëè ó âàñ âîçíèêëè îïàñåíèÿ, ÷òî âû ìîæåòå ïðè÷èíèòü âðåä ñåáå èëè ðåáåíêó,  íåçàìåäëèòåëüíî ïîçâîíèòå âðà÷ó.     Ó âàñ ïîÿâèëèñü âîïðîñû ïîñëå òîãî, êàê âû âåðíóëèñü äîìîé èç áîëüíèöû.    ×àñòî ìîéòå ðóêè:  Ïåðåä òåì êàê äîòðàãèâàòüñÿ äî ñâîåé ïðîìåæíîñòè èëè øâîâ, âñåãäà ìîéòå ðóêè.  Ýòî ïîçâîëÿåò ñîêðàòèòü ðèñê ðàçâèòèÿ èíôåêöèè.  Åñëè ðóêè ó âàñ íå ãðÿçíûå, äëÿ ñîáëþäåíèÿ íàäëåæàùåé ãèãèåíû âû ìîæåòå ïðîòåðåòü èõ ñ ïîìîùüþ äåçèíôèöèðóþùåãî ñîñòàâà äëÿ ðóê íà ñïèðòîâîé îñíîâå.  Êîðîòêî ïîäñòðèãàéòå íîãòè è óõàæèâàéòå çà íèìè.      Vaginal Delivery Discharge Instructions  Activity:     Ask family and friends for help when you need it.    Do not place anything in your vagina until your doctor approves.    Take it easy for the next few weeks to allow your body to recover. You may do any activities you feel up to at that point.    Do not drive while taking pain pills prescribed by your doctor. You may drive if taking over-the-counter pain pills.    Call your health care provider if you have any of these symptoms:    You soak a sanitary pad with blood within 1 hour, or you see blood clots larger than a golf ball.    Bleeding that lasts more than 6 weeks.    You have vaginal discharge that smells bad.     A fever of 100.4  F (38  C) or higher (temperature taken under your tongue), with or without chills     Severe, pain, cramping or tenderness in your lower belly area.    Increased pain, swelling, redness or fluid around your stitches.    A more frequent or urgent need to urinate (pee), or it burns when you pee.    Redness, swelling or pain around a vein in your leg.    Problems breastfeeding, or a red or painful area on your breast.    Pain that increases or does not go away from an episiotomy or perineal tear.    Nausea and vomiting.    Chest pain and cough or are gasping for air.    Problems coping with sadness, anxiety, or depression.     If you have any concerns about hurting yourself or the baby, call your doctor right away.     You have questions or concerns after you return  home.    Keep your hands clean:  Always wash your hands before touching your perineal area and stitches.  This helps reduce your risk of infection.  If your hands aren t dirty, you may use an alcohol hand-rub to clean your hands. Keep your nails clean and short.

## 2017-06-27 NOTE — PROGRESS NOTES
"OB Post-partum Note  PPD# 1    S:  Patient doing well.  Pain controlled.  Voiding.  Bleeding is normal.  Breast feeding.  Desires D/C home.    O:  /88  Pulse 68  Temp 97.6  F (36.4  C) (Oral)  Resp 18  Ht 1.676 m (5' 6\")  Wt 84.4 kg (186 lb)  Breastfeeding? Unknown  BMI 30.02 kg/m2  Gen- A&O, NAD  Abd- Non-tender, fundus firm at umbilicus  Ext- non-tender, no edema, no leg or calf pain  Perineum  - swelling, repair intact    Hemoglobin   Date Value Ref Range Status   2017 9.2 (L) 11.7 - 15.7 g/dL Final     O+  Rubella Immune    A/P: 27 year old  PPD# 1 s/p     1.  Routine post-partum cares  2.  Blood loss anemia - begin Fe supplement.  3.  Discharge requested today.  Okay if peds D/C baby.  Rx for Fe supplement, ibuprofen and stool softner given.  F/U in 6 weeks.  4.  The plan of care was discussed with the patient.  She expressed understanding and agreement      Debbi Granados  2017  9:21 AM  "

## 2017-06-27 NOTE — PLAN OF CARE
Problem: Discharge Planning  Goal: Discharge Planning (Adult, OB, Behavioral, Peds)  Outcome: Declining  AVS/DC teaching and medications reviewed with patient. Patient is aware of when to call and follow-up. Patient is aware of resources available.  Aware of Paic[place] scale and when to retake and call.Teaching is completed.  present, no questions.

## 2017-06-27 NOTE — PLAN OF CARE
Problem: Goal Outcome Summary  Goal: Goal Outcome Summary  Outcome: Adequate for Discharge Date Met:  06/27/17  Meeting goals for shift, perineal swelling improved. Using ICE and tucks, declined medication. Breasts tender using mothers love cream and gel pads. Abdominal binder for comfort.  there this am with MD and with this writer as well and at this writing.

## 2017-06-27 NOTE — LACTATION NOTE
Lactation visit. Mom reports some nipple pain with nursing. Offered to return for a feeding so returned at 0930. Showed mom the picture of Breastfeeding latch on 123 for best position.  She worked to get baby onto the breast. Baby needed to have lower lip flanged out which mom said helped a little. She reports her pain at 4 however, she is not wincing or acting very painful at the time. Moving jaws forward, retracting chin did not change the pain but lifting the breast and flanging out the lower lip helped a little. Enc mom to continue to work on the off centered latch and be sure lip is out, showed dad how to help. She may be d/c today. Encouraged mom to call us PRN.   Note: nipples were round pc and not reddened so most likely will not crack and bleed.

## 2017-06-27 NOTE — ANESTHESIA POSTPROCEDURE EVALUATION
Patient: Linda Serrano    * No procedures listed *    Diagnosis:* No pre-op diagnosis entered *  Diagnosis Additional Information: Labor pain    Anesthesia Type:  Epidural    Note:  Anesthesia Post Evaluation         Comments:     S/P epidural for labor.   I or my partner was immediately available for management of this patient during epidural analgesia infusion.  VSS.  Doing well. Block resolved.  Neuro at baseline. Denies positional headache. Minimal side effects easily managed w/ PRN meds. No apparent anesthetic complications. No follow-up required.    Abilio Marquez MD        Last vitals:  Vitals:    06/26/17 1906 06/27/17 0013 06/27/17 0754   BP: 125/79 108/61 119/88   Pulse: 73 68    Resp: 18 18 18   Temp: 98.1  F (36.7  C) 98  F (36.7  C) 97.6  F (36.4  C)         Electronically Signed By: Abilio Marquez MD  June 27, 2017  9:38 AM

## 2017-06-27 NOTE — PLAN OF CARE
Problem: Goal Outcome Summary  Goal: Goal Outcome Summary  Outcome: Improving  Data: Vital signs within normal limits. Postpartum checks within normal limits - see flow record. Patient eating and drinking normally. Patient able to empty bladder independently and is up ambulating. No apparent signs of infection. Laceration healing well. Patient performing self cares and is able to care for infant.  Action: Patient medicated during the shift for pain. See MAR. Patient reassessed within 1 hour after each medication and pain was improved - patient stated she was comfortable. Patient education done about postpartum cares. See flow record. Ice and tucks for comfort.  Response: Positive attachment behaviors observed with infant. Support persons Heor present for most of shift.   Plan: Anticipate discharge on 6/28/17.

## 2017-07-17 NOTE — PROCEDURES
[place] [id number]  DICTATED BY:  JANN LOCK MD  PATIENT:  IRAJ MULLINS  MRN:   [id number]  :  1990    DELIVERY SUMMARY    ADMISSION DIAGNOSIS:  Intrauterine pregnancy, 38 weeks 4 days' gestation, prolonged prodromal labor, spontaneous active labor, spontaneous rupture of membranes, group B streptococcal negative.     POSTPARTUM DIAGNOSIS:  Status post Pitocin augmented normal spontaneous vaginal delivery infant male, 8 pounds 5 ounces, Apgars 9 and 9.  Repair of vaginal and perineal lacerations.      PATIENT IDENTIFICATION:  Iraj Mullins is a 27-year-old,  woman,  1, para 0, at 38 weeks, 4 days' gestation, who is admitted to Labor and Delivery at [facility] for evaluation of spontaneous active labor.  The patient was followed at our office since 11 weeks' gestation.  Her prenatal care was complicated by initially a low-lying placenta, but this resolved by 28 weeks' gestation.  She was also diagnosed with supraventricular tachycardia.  She was seen by Cardiology and had a normal echo.  No follow up was recommended.  The patient declined a Tdap.  She spoke Danish, but did not request an , as she had excellent understanding and communication in English.  Her blood type O-positive, antibody screen negative, Rubella titer showed immunity, hepatitis B, HIV, RPR were all negative, but chlamydia and gonorrhea DNA probe were negative.  One-hour glucose screen was 136, GBS was negative at term, and genetic screening tests were declined.  An ultrasound at 11 weeks showed viable intrauterine pregnancy and agreed with the last menstrual period due date. A 20-week ultrasound was normal other than the low-lying placenta.  A repeat ultrasound at 28 weeks showed that the low-lying placenta had resolved.      The patient contacted our office early in the morning of 2017, reporting spontaneous uterine contractions.  She was evaluated initially in the maternal assessment area  at [facility] where she was 2 cm dilated.  Fetal heart tones were category 1.  The patient was having contractions between 1 and 5 minutes apart, some were uncomfortable, and some were not perceivable.  The patient ambulated, was observed and she was re-examined.  The cervix had changed to 3 cm dilation.  The plan was to admit the patient for suspicion of spontaneous active labor.  The patient continued to contract, albeit somewhat irregularly and not consistently painful.  She ambulated, was re-examined.  Her cervix was unchanged.  It remained 3 cm.  When her contractions began to space out and were less frequent, discussion was held regarding the plan of care.  It was elected that the patient would be discharged to return when she was in active labor.  The patient did return later in the evening, at approximately 0545 hours reporting increasing uterine activity.  She was reexamined and her cervix was unchanged.  Fetal heart tones remained category 1.  She again ambulated, was observed for a period of time, and when it was apparent that she was still not in active labor, the patient was discharged home with a prescription for Vistaril 100 mg to take orally for rest.  After a period of time at home, the patient's contractions increased in frequency and intensity.  She returned at 10:05 p.m. on 06/25/2017 with contractions every 2-4 minutes and stronger.  Her cervix was 4-5 cm dilated, 100% effaced, vertex -2.  This represented a significant change from her previous exam.  The patient was admitted for evaluation of spontaneous labor.  Initially, the patient had planned an unmedicated delivery.  However, with the sequence of events above, and increasing discomfort, the patient did elect to have an epidural.  Fetal heart tones were category 1.  The patient was afebrile and her membranes were intact.  Her group B strep status was negative.  The patient received an epidural anesthetic at 11:15 p.m. on  06/25/2017.  This provided excellent relief.  Spontaneous rupture of membranes occurred at 0010 hours on 06/26/2017.  The amniotic fluid was clear.  The patient was examined and she was 6 cm dilated, 100% effaced, vertex -2.  She was afebrile, the fetal heart rate tracing was category 1, and contractions were every 6-7 minutes.  When she was unchanged 1 hour later, intravenous oxytocin augmentation of labor was begun.  The patient had excellent relief of pain from the epidural.  The Pitocin was increased to a maximum of 2 mU/min.      She was reexamined at 0405 hours and the cervix was completely dilated and completely effaced.  After a period of observation, the patient was reevaluated, and the vertex was at the +3 station.      SECOND STAGE OF LABOR:  The patient was completely dilated by 0405 hours on 06/26/2017.  The plan was to labor down, but when she was reassessed approximately 30 minutes later, the vertex was at +3 station.  Fetal heart tones were category 1.  Preparations were then made to begin maternal expulsive efforts.  The patient pushed effectively and brought the baby to a full crown.  I had arrived at the hospital when the patient had begun pushing.  Fetal heart tones remained satisfactory as the patient pushed and brought the baby to a full crown.  I was called to attend the delivery.  No episiotomy was made.  At 0532 hours on 06/26/2017, the patient delivered an infant male in the TARAH position.  The nose and mouth were bulb suctioned.  A single nuchal cord was clamped and cut on the perineum.  The remainder of the baby was delivered without shoulder dystocia.  The baby was immediately placed on the mother's abdomen.  The product of the pregnancy, an infant male, 8 pounds 5 ounces, Apgars 9 and 9 at one and five minutes, respectively.  No resuscitation was necessary, there was no birth trauma, no congenital anomalies were noted.      THIRD STAGE LABOR:  After a 5 minute 3rd stage of labor, the  placenta delivered spontaneously intact, with a 3-vessel cord.  Examination of the perineum found 2nd degree vaginal as well as perineal/labial lacerations.  There were bilateral vaginal lacerations just inside the introitus, as well as avulsion of the vaginal mucosa from the mucosal surface of the labial minora.  The 2nd degree vaginal lacerations on each side were repaired with 3-0 Vicryl and the avulsed vaginal mucosa was reapproximated to the perineal skin at the introitus.  The repair was satisfactory.  The patient tolerated the procedure well.  All sponge and needle counts were correct at the conclusion of the delivery.  The baby remained with the patient and her , as well as the rest of the family in the birthing room.  There were no apparent complications.      The blood loss was 150 mL.  Uterine tone was excellent as Pitocin was infusing.      DELIVERY SUMMARY:  Pregnancy 38+ weeks' gestation.  Prolonged prodromal labor.  Spontaneous active labor.  Spontaneous rupture of membranes.  Pitocin augmented normal spontaneous vaginal delivery infant male, 8 pounds 5 ounces, Apgars 9 and 9.  Placental passage intact.  Repair of bilateral 2nd degree vaginal lacerations as well as avulsion of the vaginal mucosa from the perineal tissues.  Group B strep negative.  Satisfactory maternal and fetal status post-delivery.            Cc:  Shoaib Dasilva MD

## 2017-08-24 ENCOUNTER — OFFICE VISIT (OUTPATIENT)
Dept: CARDIOLOGY | Facility: CLINIC | Age: 27
End: 2017-08-24
Attending: PHYSICIAN ASSISTANT
Payer: COMMERCIAL

## 2017-08-24 VITALS
DIASTOLIC BLOOD PRESSURE: 62 MMHG | WEIGHT: 166 LBS | SYSTOLIC BLOOD PRESSURE: 92 MMHG | HEIGHT: 66 IN | BODY MASS INDEX: 26.68 KG/M2 | HEART RATE: 72 BPM

## 2017-08-24 DIAGNOSIS — R00.2 PALPITATIONS: ICD-10-CM

## 2017-08-24 DIAGNOSIS — I47.10 PAROXYSMAL SUPRAVENTRICULAR TACHYCARDIA (H): Primary | ICD-10-CM

## 2017-08-24 PROCEDURE — 99204 OFFICE O/P NEW MOD 45 MIN: CPT | Mod: 25 | Performed by: INTERNAL MEDICINE

## 2017-08-24 PROCEDURE — 93000 ELECTROCARDIOGRAM COMPLETE: CPT | Performed by: INTERNAL MEDICINE

## 2017-08-24 NOTE — PROGRESS NOTES
HPI and Plan:   See dictation    Orders Placed This Encounter   Procedures     EKG 12-lead complete w/read - Clinics (performed today)     EP Ablation Procedures       No orders of the defined types were placed in this encounter.      There are no discontinued medications.      Encounter Diagnoses   Name Primary?     Palpitations      Paroxysmal supraventricular tachycardia (H) Yes       CURRENT MEDICATIONS:  Current Outpatient Prescriptions   Medication Sig Dispense Refill     docusate sodium (COLACE) 100 MG tablet Take 100 mg by mouth 2 times daily as needed for constipation 60 tablet 1     ibuprofen (ADVIL/MOTRIN) 800 MG tablet Take 1 tablet (800 mg) by mouth every 8 hours as needed for moderate pain 30 tablet 1     ferrous sulfate (SLO-FE) 142 (45 FE) MG TBCR Take 1 tablet (142 mg) by mouth daily 30 tablet 1     Prenatal MV-Min-Fe Cbn-FA-DHA (PRENATAL PLUS DHA PO) Take by mouth daily         ALLERGIES   No Known Allergies    PAST MEDICAL HISTORY:  Past Medical History:   Diagnosis Date     Palpitations      SVT (supraventricular tachycardia) (H)        PAST SURGICAL HISTORY:  Past Surgical History:   Procedure Laterality Date     DRAIN SKIN ABSCESS SIMPLE      left thumb     STRESS ECHO ([facility])  1/2010    Normal       FAMILY HISTORY:  Family History   Problem Relation Age of Onset     Coronary Artery Disease No family hx of      Asthma No family hx of      C.A.D. No family hx of      DIABETES Father      Hypertension Mother      Breast Cancer No family hx of      Cancer - colorectal No family hx of      Anesthesia Reaction No family hx of      Blood Disease No family hx of      Eye Disorder No family hx of      OSTEOPOROSIS No family hx of      Thyroid Disease No family hx of      DIABETES Mother        SOCIAL HISTORY:  Social History     Social History     Marital status:      Spouse name: N/A     Number of children: N/A     Years of education: N/A     Occupational History     Student      Social History  Main Topics     Smoking status: Never Smoker     Smokeless tobacco: None     Alcohol use No     Drug use: No     Sexual activity: No     Other Topics Concern     Caffeine Concern Yes     tea or coffee 2-3 per day     Special Diet No     Exercise No     push ups and sit ups     Bike Helmet Not Asked     NA     Social History Narrative    ** Merged History Encounter **         Eats fruits and vegetables every day. Calcium and vitamin D supplements recommended.       Review of Systems:  Skin:  Negative       Eyes:  Negative      ENT:  Negative      Respiratory:  Negative for shortness of breath;dyspnea on exertion     Cardiovascular:  lightheadedness;dizziness;Negative for palpitations;Positive for;fatigue;exercise intolerance    Gastroenterology: Positive for heartburn    Genitourinary:  Negative      Musculoskeletal:  Negative      Neurologic:  Negative      Psychiatric:  Negative      Heme/Lymph/Imm:  Negative      Endocrine:  Negative        [id number]

## 2017-08-24 NOTE — LETTER
8/24/2017    JAMIE MEJIA MD  [address]  [facility] [address]    RE: Linda Montemayorchristopher       Dear Colleague,    I had the pleasure of seeing Linda Serrano in the [facility].    It was my pleasure seeing Ms. Linda Serrano for evaluation of SVT.  She is a pleasant 27-year-old Indian-speaking female with history of episodes of tachycardia since age 10.  She had been previously treated with metoprolol which stopped because of fatigue.      Rapid SVT with rates exceeding 200 beats per minute was documented on an event monitor earlier in the year.  The patient was seen by Dr. Velazquez and Kate Dowell.  The patient was pregnant at the time and in fact had multiple episodes of SVT during pregnancy.  She delivered about 2 months ago.  Episodes of SVT have decreased, but she still has at least 1 episode per month.      Symptoms during SVT include chest tightness and lightheadedness, though no amara syncope.  Episodes rarely exceed 5 minutes.      She is a nonsmoker and does not drink much alcohol.      PHYSICAL EXAMINATION:   VITAL SIGNS:  Blood pressure 92/62, pulse 70 and regular, weight 75 kg, height 170 cm.  She is a healthy-appearing woman in no apparent distress.  A Indian  is present who helps with translation.   HEENT:  Normocephalic.   NECK:  Supple without bruits.   LUNGS:  Clear.   CARDIOVASCULAR:  Regular rhythm.  No gallop, murmur or rub.   ABDOMEN:  Soft, nontender.      DIAGNOSTIC STUDIES:  Her 12-lead ECG today showed sinus rhythm with no ventricular preexcitation.     Outpatient Encounter Prescriptions as of 8/24/2017   Medication Sig Dispense Refill     [DISCONTINUED] docusate sodium (COLACE) 100 MG tablet Take 100 mg by mouth 2 times daily as needed for constipation 60 tablet 1     [DISCONTINUED] ibuprofen (ADVIL/MOTRIN) 800 MG tablet Take 1 tablet (800 mg) by mouth every 8 hours as needed for moderate pain 30 tablet 1      [DISCONTINUED] ferrous sulfate (SLO-FE) 142 (45 FE) MG TBCR Take 1 tablet (142 mg) by mouth daily 30 tablet 1     [DISCONTINUED] Prenatal MV-Min-Fe Cbn-FA-DHA (PRENATAL PLUS DHA PO) Take by mouth daily       No facility-administered encounter medications on file as of 8/24/2017.       IMPRESSION & PLAN:    1.  Paroxysmal supraventricular tachycardia.  Ms. Serrano has a very fast SVT with rates exceeding 200 beats per minute.  The arrhythmia frequency significantly increased during pregnancy.  The patient has a 2-month-old baby and intends to have more children in the future.  She previously did not tolerate medical therapy well.      We discussed the following treatment options:   1.  Observation.   2.  Medical therapy with a calcium channel blocker (since she did not tolerate metoprolol well).  One issue is her BP which is low normal to begin with.   3.  Catheter ablation of SVT.  The technical aspects, risks and benefits of SVT ablation were discussed in detail with the patient.  I quoted her a greater than 95% likelihood of successful ablation for re-entrant SVT with the risk of serious complication of about 1%-1.5%.  Potential complications include, but are not limited to bleeding, vascular injury requiring repair, DVT, cardiac perforation with tamponade requiring a pericardial drainage, phrenic nerve or valvular injury, conduction system injury requiring pacemaker implantation, CVA, and other unforeseen complications.      After discussing the pros and cons of each approach, Linda has decided to proceed with catheter ablation.  The procedure will be scheduled electively at her convenience.  Because she is breastfeeding at this point, I asked her to pump and discard breast milk for 24 hours after the procedure.      It was my pleasure being involved in her care.     Sincerely,    Nat Oneal MD     [facility]

## 2017-08-24 NOTE — PROGRESS NOTES
HISTORY OF PRESENT ILLNESS:    It was my pleasure seeing Ms. Linda Serrano for evaluation of SVT.  She is a pleasant 27-year-old Grenadian-speaking female with history of episodes of tachycardia since age 10.  She had been previously treated with metoprolol which stopped because of fatigue.      Rapid SVT with rates exceeding 200 beats per minute was documented on an event monitor earlier in the year.  The patient was seen by Dr. Velazquez and Kate Dowell.  The patient was pregnant at the time and in fact had multiple episodes of SVT during pregnancy.  She delivered about 2 months ago.  Episodes of SVT have decreased, but she still has at least 1 episode per month.      Symptoms during SVT include chest tightness and lightheadedness, though no amara syncope.  Episodes rarely exceed 5 minutes.      She is a nonsmoker and does not drink much alcohol.      PHYSICAL EXAMINATION:   VITAL SIGNS:  Blood pressure 92/62, pulse 70 and regular, weight 75 kg, height 170 cm.  She is a healthy-appearing woman in no apparent distress.  A Grenadian  is present who helps with translation.   HEENT:  Normocephalic.   NECK:  Supple without bruits.   LUNGS:  Clear.   CARDIOVASCULAR:  Regular rhythm.  No gallop, murmur or rub.   ABDOMEN:  Soft, nontender.      DIAGNOSTIC STUDIES:  Her 12-lead ECG today showed sinus rhythm with no ventricular preexcitation.      IMPRESSION & PLAN:    1.  Paroxysmal supraventricular tachycardia.  Ms. Serrano has a very fast SVT with rates exceeding 200 beats per minute.  The arrhythmia frequency significantly increased during pregnancy.  The patient has a 2-month-old baby and intends to have more children in the future.  She previously did not tolerate medical therapy well.      We discussed the following treatment options:   1.  Observation.   2.  Medical therapy with a calcium channel blocker (since she did not tolerate metoprolol well).  One issue is her BP which is low normal to begin with.   3.   Catheter ablation of SVT.  The technical aspects, risks and benefits of SVT ablation were discussed in detail with the patient.  I quoted her a greater than 95% likelihood of successful ablation for re-entrant SVT with the risk of serious complication of about 1%-1.5%.  Potential complications include, but are not limited to bleeding, vascular injury requiring repair, DVT, cardiac perforation with tamponade requiring a pericardial drainage, phrenic nerve or valvular injury, conduction system injury requiring pacemaker implantation, CVA, and other unforeseen complications.      After discussing the pros and cons of each approach, Iraj has decided to proceed with catheter ablation.  The procedure will be scheduled electively at her convenience.  Because she is breastfeeding at this point, I asked her to pump and discard breast milk for 24 hours after the procedure.      It was my pleasure being involved in her care.         JOVANI GODWIN MD, [facility]             D: 2017 15:40   T: 2017 16:41   MT: ALY      Name:     IRAJ MULLINS   MRN:      [id number]        Account:      [id number]   :      1990           Service Date: 2017      Document: [id number]

## 2017-08-24 NOTE — MR AVS SNAPSHOT
"              After Visit Summary   8/24/2017    Linda Serrano    MRN: [id number]           Patient Information     Date Of Birth          1990        Visit Information        Provider Department      8/24/2017 2:30 PM Nat Oneal MD; [employer] [facility]        Today's Diagnoses     Paroxysmal supraventricular tachycardia (H)    -  1    Palpitations           Follow-ups after your visit        Your next 10 appointments already scheduled     Sep 06, 2017  1:00 PM CDT   Ep 90 Minute with SHCVR3   [facility] ([facility])    [address]  [place] [address]   [phone]              Future tests that were ordered for you today     Open Future Orders        Priority Expected Expires Ordered    EP Ablation Procedures Routine 8/31/2017 8/24/2018 8/24/2017            Who to contact     If you have questions or need follow up information about today's clinic visit or your schedule please contact [facility] directly at [phone].  Normal or non-critical lab and imaging results will be communicated to you by MyChart, letter or phone within 4 business days after the clinic has received the results. If you do not hear from us within 7 days, please contact the clinic through MyChart or phone. If you have a critical or abnormal lab result, we will notify you by phone as soon as possible.  Submit refill requests through [employer] or call your pharmacy and they will forward the refill request to us. Please allow 3 business days for your refill to be completed.          Additional Information About Your Visit        Procurahart Information     [employer] lets you send messages to your doctor, view your test results, renew your prescriptions, schedule appointments and more. To sign up, go to [email or web address] . Click on \"Log in\" on the left side of the " "screen, which will take you to the Welcome page. Then click on \"Sign up Now\" on the right side of the page.     You will be asked to enter the access code listed below, as well as some personal information. Please follow the directions to create your username and password.     Your access code is: [email or web address]  Expires: 2017 12:36 PM     Your access code will  in 90 days. If you need help or a new code, please call your [facility] clinic or [phone].        Care EveryWhere ID     This is your Care EveryWhere ID. This could be used by other organizations to access your [facility] medical records  [id number]        Your Vitals Were     Pulse Height BMI (Body Mass Index)             72 1.676 m (5' 6\") 26.79 kg/m2          Blood Pressure from Last 3 Encounters:   17 92/62   17 119/88   17 134/89    Weight from Last 3 Encounters:   17 75.3 kg (166 lb)   17 84.4 kg (186 lb)   17 84.4 kg (186 lb)              We Performed the Following     EKG 12-lead complete w/read - Clinics (performed today)     Follow-Up with Electrophysiologist        Primary Care Provider Office Phone # Fax #    Yaf Phys MD Dianna [phone] [address] [place] [address]        Equal Access to Services     JYOTSNA DELGADO : Hadii aad ku hadasho Soivy, waaxda luqadaha, qaybta kaalmada mariana, yair lowry. So [facility] [phone].    ATENCIÓN: Si hannahla jadiel, tiene a davila disposición servicios gratuitos de asistencia lingüística. Llame al [phone].    We comply with applicable federal civil rights laws and [place] laws. We do not discriminate on the basis of race, color, national origin, age, disability sex, sexual orientation or gender identity.            Thank you!     Thank you for choosing [facility]  for your care. Our goal is always to provide you with excellent care. " Hearing back from our patients is one way we can continue to improve our services. Please take a few minutes to complete the written survey that you may receive in the mail after your visit with us. Thank you!             Your Updated Medication List - Protect others around you: Learn how to safely use, store and throw away your medicines at www.disposemymeds.org.          This list is accurate as of: 8/24/17  3:41 PM.  Always use your most recent med list.                   Brand Name Dispense Instructions for use Diagnosis    docusate sodium 100 MG tablet    COLACE    60 tablet    Take 100 mg by mouth 2 times daily as needed for constipation    Anemia due to blood loss, acute       ferrous sulfate 142 (45 FE) MG Tbcr    SLO-FE    30 tablet    Take 1 tablet (142 mg) by mouth daily    Anemia due to blood loss, acute       ibuprofen 800 MG tablet    ADVIL/MOTRIN    30 tablet    Take 1 tablet (800 mg) by mouth every 8 hours as needed for moderate pain    Normal delivery       PRENATAL PLUS DHA PO      Take by mouth daily

## 2017-09-05 DIAGNOSIS — I47.10 SVT (SUPRAVENTRICULAR TACHYCARDIA) (H): Primary | ICD-10-CM

## 2017-09-05 RX ORDER — LIDOCAINE 40 MG/G
CREAM TOPICAL
Status: CANCELLED | OUTPATIENT
Start: 2017-09-05

## 2017-09-05 RX ORDER — SODIUM CHLORIDE 450 MG/100ML
INJECTION, SOLUTION INTRAVENOUS CONTINUOUS
Status: CANCELLED | OUTPATIENT
Start: 2017-09-05

## 2017-09-06 ENCOUNTER — HOSPITAL ENCOUNTER (OUTPATIENT)
Facility: CLINIC | Age: 27
Discharge: HOME | End: 2017-09-06
Attending: INTERNAL MEDICINE | Admitting: INTERNAL MEDICINE
Payer: COMMERCIAL

## 2017-09-06 ENCOUNTER — APPOINTMENT (OUTPATIENT)
Dept: CARDIOLOGY | Facility: CLINIC | Age: 27
End: 2017-09-06
Attending: INTERNAL MEDICINE
Payer: COMMERCIAL

## 2017-09-06 VITALS
SYSTOLIC BLOOD PRESSURE: 106 MMHG | TEMPERATURE: 97.7 F | RESPIRATION RATE: 16 BRPM | OXYGEN SATURATION: 98 % | WEIGHT: 167.7 LBS | DIASTOLIC BLOOD PRESSURE: 71 MMHG | BODY MASS INDEX: 27.07 KG/M2 | HEART RATE: 67 BPM

## 2017-09-06 LAB
ANION GAP SERPL CALCULATED.3IONS-SCNC: 5 MMOL/L (ref 3–14)
BUN SERPL-MCNC: 9 MG/DL (ref 7–30)
CALCIUM SERPL-MCNC: 9.3 MG/DL (ref 8.5–10.1)
CHLORIDE SERPL-SCNC: 107 MMOL/L (ref 94–109)
CO2 SERPL-SCNC: 27 MMOL/L (ref 20–32)
CREAT SERPL-MCNC: 0.79 MG/DL (ref 0.52–1.04)
ERYTHROCYTE [DISTWIDTH] IN BLOOD BY AUTOMATED COUNT: 15.6 % (ref 10–15)
GFR SERPL CREATININE-BSD FRML MDRD: 87 ML/MIN/1.7M2
GLUCOSE SERPL-MCNC: 84 MG/DL (ref 70–99)
HCG SERPL QL: NEGATIVE
HCT VFR BLD AUTO: 39.6 % (ref 35–47)
HGB BLD-MCNC: 12.9 G/DL (ref 11.7–15.7)
MCH RBC QN AUTO: 27.9 PG (ref 26.5–33)
MCHC RBC AUTO-ENTMCNC: 32.6 G/DL (ref 31.5–36.5)
MCV RBC AUTO: 86 FL (ref 78–100)
PLATELET # BLD AUTO: 166 10E9/L (ref 150–450)
POTASSIUM SERPL-SCNC: 4 MMOL/L (ref 3.4–5.3)
RBC # BLD AUTO: 4.62 10E12/L (ref 3.8–5.2)
SODIUM SERPL-SCNC: 139 MMOL/L (ref 133–144)
WBC # BLD AUTO: 5.1 10E9/L (ref 4–11)

## 2017-09-06 PROCEDURE — 40000852 ZZH STATISTIC HEART CATH LAB OR EP LAB

## 2017-09-06 PROCEDURE — 93653 COMPRE EP EVAL TX SVT: CPT | Performed by: INTERNAL MEDICINE

## 2017-09-06 PROCEDURE — 25000125 ZZHC RX 250: Performed by: INTERNAL MEDICINE

## 2017-09-06 PROCEDURE — 25000128 H RX IP 250 OP 636: Performed by: INTERNAL MEDICINE

## 2017-09-06 PROCEDURE — 4A0234Z MEASUREMENT OF CARDIAC ELECTRICAL ACTIVITY, PERCUTANEOUS APPROACH: ICD-10-PCS | Performed by: INTERNAL MEDICINE

## 2017-09-06 PROCEDURE — 27210796 ZZH PAD EXTRNAL REFRENCE CARDIAC MAPPING CR14

## 2017-09-06 PROCEDURE — 93621 COMP EP EVL L PAC&REC C SINS: CPT | Mod: 26 | Performed by: INTERNAL MEDICINE

## 2017-09-06 PROCEDURE — C1732 CATH, EP, DIAG/ABL, 3D/VECT: HCPCS

## 2017-09-06 PROCEDURE — 93623 PRGRMD STIMJ&PACG IV RX NFS: CPT

## 2017-09-06 PROCEDURE — 36415 COLL VENOUS BLD VENIPUNCTURE: CPT | Performed by: INTERNAL MEDICINE

## 2017-09-06 PROCEDURE — C1730 CATH, EP, 19 OR FEW ELECT: HCPCS

## 2017-09-06 PROCEDURE — 3E033KZ INTRODUCTION OF OTHER DIAGNOSTIC SUBSTANCE INTO PERIPHERAL VEIN, PERCUTANEOUS APPROACH: ICD-10-PCS | Performed by: INTERNAL MEDICINE

## 2017-09-06 PROCEDURE — 93621 COMP EP EVL L PAC&REC C SINS: CPT

## 2017-09-06 PROCEDURE — 93613 INTRACARDIAC EPHYS 3D MAPG: CPT | Performed by: INTERNAL MEDICINE

## 2017-09-06 PROCEDURE — 84703 CHORIONIC GONADOTROPIN ASSAY: CPT | Performed by: INTERNAL MEDICINE

## 2017-09-06 PROCEDURE — 4A023FZ MEASUREMENT OF CARDIAC RHYTHM, PERCUTANEOUS APPROACH: ICD-10-PCS | Performed by: INTERNAL MEDICINE

## 2017-09-06 PROCEDURE — 99152 MOD SED SAME PHYS/QHP 5/>YRS: CPT | Performed by: INTERNAL MEDICINE

## 2017-09-06 PROCEDURE — 93005 ELECTROCARDIOGRAM TRACING: CPT

## 2017-09-06 PROCEDURE — 85027 COMPLETE CBC AUTOMATED: CPT | Performed by: INTERNAL MEDICINE

## 2017-09-06 PROCEDURE — 02K83ZZ MAP CONDUCTION MECHANISM, PERCUTANEOUS APPROACH: ICD-10-PCS | Performed by: INTERNAL MEDICINE

## 2017-09-06 PROCEDURE — 27210995 ZZH RX 272: Performed by: INTERNAL MEDICINE

## 2017-09-06 PROCEDURE — C1893 INTRO/SHEATH, FIXED,NON-PEEL: HCPCS

## 2017-09-06 PROCEDURE — 40000235 ZZH STATISTIC TELEMETRY

## 2017-09-06 PROCEDURE — 93623 PRGRMD STIMJ&PACG IV RX NFS: CPT | Mod: 26 | Performed by: INTERNAL MEDICINE

## 2017-09-06 PROCEDURE — 02563ZZ DESTRUCTION OF RIGHT ATRIUM, PERCUTANEOUS APPROACH: ICD-10-PCS | Performed by: INTERNAL MEDICINE

## 2017-09-06 PROCEDURE — 99152 MOD SED SAME PHYS/QHP 5/>YRS: CPT

## 2017-09-06 PROCEDURE — 27210782 ZZH KIT EP TOTES DISP CR7

## 2017-09-06 PROCEDURE — 93653 COMPRE EP EVAL TX SVT: CPT

## 2017-09-06 PROCEDURE — 80048 BASIC METABOLIC PNL TOTAL CA: CPT | Performed by: INTERNAL MEDICINE

## 2017-09-06 PROCEDURE — 27210795 ZZH PAD DEFIB QUICK CR4

## 2017-09-06 PROCEDURE — 99153 MOD SED SAME PHYS/QHP EA: CPT

## 2017-09-06 PROCEDURE — 84702 CHORIONIC GONADOTROPIN TEST: CPT | Performed by: INTERNAL MEDICINE

## 2017-09-06 PROCEDURE — 93010 ELECTROCARDIOGRAM REPORT: CPT | Performed by: INTERNAL MEDICINE

## 2017-09-06 PROCEDURE — 93613 INTRACARDIAC EPHYS 3D MAPG: CPT

## 2017-09-06 RX ORDER — SODIUM CHLORIDE 450 MG/100ML
INJECTION, SOLUTION INTRAVENOUS CONTINUOUS
Status: DISCONTINUED | OUTPATIENT
Start: 2017-09-06 | End: 2017-09-06 | Stop reason: HOSPADM

## 2017-09-06 RX ORDER — DIPHENHYDRAMINE HYDROCHLORIDE 50 MG/ML
25-50 INJECTION INTRAMUSCULAR; INTRAVENOUS
Status: DISCONTINUED | OUTPATIENT
Start: 2017-09-06 | End: 2017-09-06 | Stop reason: HOSPADM

## 2017-09-06 RX ORDER — ACETAMINOPHEN 325 MG/1
650 TABLET ORAL EVERY 4 HOURS PRN
Status: DISCONTINUED | OUTPATIENT
Start: 2017-09-06 | End: 2017-09-06 | Stop reason: HOSPADM

## 2017-09-06 RX ORDER — MORPHINE SULFATE 2 MG/ML
1-2 INJECTION, SOLUTION INTRAMUSCULAR; INTRAVENOUS EVERY 5 MIN PRN
Status: DISCONTINUED | OUTPATIENT
Start: 2017-09-06 | End: 2017-09-06 | Stop reason: HOSPADM

## 2017-09-06 RX ORDER — BUPIVACAINE HYDROCHLORIDE 2.5 MG/ML
10-30 INJECTION, SOLUTION EPIDURAL; INFILTRATION; INTRACAUDAL
Status: DISCONTINUED | OUTPATIENT
Start: 2017-09-06 | End: 2017-09-06 | Stop reason: HOSPADM

## 2017-09-06 RX ORDER — PROTAMINE SULFATE 10 MG/ML
1-5 INJECTION, SOLUTION INTRAVENOUS
Status: DISCONTINUED | OUTPATIENT
Start: 2017-09-06 | End: 2017-09-06 | Stop reason: HOSPADM

## 2017-09-06 RX ORDER — HEPARIN SODIUM 1000 [USP'U]/ML
1000-10000 INJECTION, SOLUTION INTRAVENOUS; SUBCUTANEOUS EVERY 5 MIN PRN
Status: DISCONTINUED | OUTPATIENT
Start: 2017-09-06 | End: 2017-09-06 | Stop reason: HOSPADM

## 2017-09-06 RX ORDER — PROTAMINE SULFATE 10 MG/ML
5-40 INJECTION, SOLUTION INTRAVENOUS EVERY 10 MIN PRN
Status: DISCONTINUED | OUTPATIENT
Start: 2017-09-06 | End: 2017-09-06 | Stop reason: HOSPADM

## 2017-09-06 RX ORDER — ADENOSINE 3 MG/ML
6-12 INJECTION, SOLUTION INTRAVENOUS EVERY 5 MIN PRN
Status: DISCONTINUED | OUTPATIENT
Start: 2017-09-06 | End: 2017-09-06 | Stop reason: HOSPADM

## 2017-09-06 RX ORDER — LIDOCAINE HYDROCHLORIDE AND EPINEPHRINE 10; 10 MG/ML; UG/ML
10-30 INJECTION, SOLUTION INFILTRATION; PERINEURAL
Status: DISCONTINUED | OUTPATIENT
Start: 2017-09-06 | End: 2017-09-06 | Stop reason: HOSPADM

## 2017-09-06 RX ORDER — LIDOCAINE 40 MG/G
CREAM TOPICAL
Status: DISCONTINUED | OUTPATIENT
Start: 2017-09-06 | End: 2017-09-06 | Stop reason: HOSPADM

## 2017-09-06 RX ORDER — LIDOCAINE HYDROCHLORIDE 10 MG/ML
10-30 INJECTION, SOLUTION EPIDURAL; INFILTRATION; INTRACAUDAL; PERINEURAL
Status: COMPLETED | OUTPATIENT
Start: 2017-09-06 | End: 2017-09-06

## 2017-09-06 RX ORDER — LORAZEPAM 2 MG/ML
.5-2 INJECTION INTRAMUSCULAR EVERY 10 MIN PRN
Status: DISCONTINUED | OUTPATIENT
Start: 2017-09-06 | End: 2017-09-06 | Stop reason: HOSPADM

## 2017-09-06 RX ORDER — FUROSEMIDE 10 MG/ML
20-100 INJECTION INTRAMUSCULAR; INTRAVENOUS
Status: DISCONTINUED | OUTPATIENT
Start: 2017-09-06 | End: 2017-09-06 | Stop reason: HOSPADM

## 2017-09-06 RX ORDER — FENTANYL CITRATE 50 UG/ML
25-50 INJECTION, SOLUTION INTRAMUSCULAR; INTRAVENOUS
Status: DISCONTINUED | OUTPATIENT
Start: 2017-09-06 | End: 2017-09-06 | Stop reason: HOSPADM

## 2017-09-06 RX ORDER — ATROPINE SULFATE 0.1 MG/ML
.5-1 INJECTION INTRAVENOUS
Status: DISCONTINUED | OUTPATIENT
Start: 2017-09-06 | End: 2017-09-06 | Stop reason: HOSPADM

## 2017-09-06 RX ORDER — LIDOCAINE HYDROCHLORIDE 10 MG/ML
10-30 INJECTION, SOLUTION EPIDURAL; INFILTRATION; INTRACAUDAL; PERINEURAL
Status: DISCONTINUED | OUTPATIENT
Start: 2017-09-06 | End: 2017-09-06 | Stop reason: HOSPADM

## 2017-09-06 RX ORDER — PROMETHAZINE HYDROCHLORIDE 25 MG/ML
6.25-25 INJECTION, SOLUTION INTRAMUSCULAR; INTRAVENOUS EVERY 4 HOURS PRN
Status: DISCONTINUED | OUTPATIENT
Start: 2017-09-06 | End: 2017-09-06 | Stop reason: HOSPADM

## 2017-09-06 RX ORDER — NALOXONE HYDROCHLORIDE 0.4 MG/ML
0.4 INJECTION, SOLUTION INTRAMUSCULAR; INTRAVENOUS; SUBCUTANEOUS EVERY 5 MIN PRN
Status: DISCONTINUED | OUTPATIENT
Start: 2017-09-06 | End: 2017-09-06 | Stop reason: HOSPADM

## 2017-09-06 RX ORDER — IBUTILIDE FUMARATE 1 MG/10ML
0.01 INJECTION, SOLUTION INTRAVENOUS
Status: DISCONTINUED | OUTPATIENT
Start: 2017-09-06 | End: 2017-09-06 | Stop reason: HOSPADM

## 2017-09-06 RX ORDER — NALOXONE HYDROCHLORIDE 0.4 MG/ML
.1-.4 INJECTION, SOLUTION INTRAMUSCULAR; INTRAVENOUS; SUBCUTANEOUS
Status: DISCONTINUED | OUTPATIENT
Start: 2017-09-06 | End: 2017-09-06 | Stop reason: HOSPADM

## 2017-09-06 RX ORDER — IBUTILIDE FUMARATE 1 MG/10ML
1 INJECTION, SOLUTION INTRAVENOUS
Status: DISCONTINUED | OUTPATIENT
Start: 2017-09-06 | End: 2017-09-06 | Stop reason: HOSPADM

## 2017-09-06 RX ORDER — DOBUTAMINE HYDROCHLORIDE 200 MG/100ML
5-40 INJECTION INTRAVENOUS CONTINUOUS PRN
Status: DISCONTINUED | OUTPATIENT
Start: 2017-09-06 | End: 2017-09-06 | Stop reason: HOSPADM

## 2017-09-06 RX ORDER — ONDANSETRON 2 MG/ML
4 INJECTION INTRAMUSCULAR; INTRAVENOUS EVERY 4 HOURS PRN
Status: DISCONTINUED | OUTPATIENT
Start: 2017-09-06 | End: 2017-09-06 | Stop reason: HOSPADM

## 2017-09-06 RX ORDER — FLUMAZENIL 0.1 MG/ML
0.2 INJECTION, SOLUTION INTRAVENOUS
Status: DISCONTINUED | OUTPATIENT
Start: 2017-09-06 | End: 2017-09-06 | Stop reason: HOSPADM

## 2017-09-06 RX ORDER — KETOROLAC TROMETHAMINE 30 MG/ML
15-30 INJECTION, SOLUTION INTRAMUSCULAR; INTRAVENOUS
Status: DISCONTINUED | OUTPATIENT
Start: 2017-09-06 | End: 2017-09-06 | Stop reason: HOSPADM

## 2017-09-06 RX ADMIN — SODIUM CHLORIDE: 4.5 INJECTION, SOLUTION INTRAVENOUS at 12:15

## 2017-09-06 RX ADMIN — FENTANYL CITRATE 75 MCG: 50 INJECTION, SOLUTION INTRAMUSCULAR; INTRAVENOUS at 14:30

## 2017-09-06 RX ADMIN — DOBUTAMINE IN DEXTROSE 10 MCG/KG/MIN: 200 INJECTION, SOLUTION INTRAVENOUS at 14:04

## 2017-09-06 RX ADMIN — ADENOSINE 6 MG: 3 INJECTION, SOLUTION INTRAVENOUS at 13:44

## 2017-09-06 RX ADMIN — DOBUTAMINE IN DEXTROSE 5 MCG/KG/MIN: 200 INJECTION, SOLUTION INTRAVENOUS at 13:34

## 2017-09-06 RX ADMIN — MIDAZOLAM HYDROCHLORIDE 1.5 MG: 1 INJECTION, SOLUTION INTRAMUSCULAR; INTRAVENOUS at 14:30

## 2017-09-06 RX ADMIN — LIDOCAINE HYDROCHLORIDE 100 MG: 10 INJECTION, SOLUTION EPIDURAL; INFILTRATION; INTRACAUDAL; PERINEURAL at 13:19

## 2017-09-06 NOTE — PROGRESS NOTES
1225 Pt states understanding of procedure with .  1500 Pt returned from cath lab. Rt groin site WDL. NSR. VSS. No discomfort.  present. Will monitor.  1700 Pt is breastfeeding and can resume when home tonight per pharmacist after medications that she has received so far. Report To Alejandra RAMOS.

## 2017-09-06 NOTE — DISCHARGE INSTRUCTIONS
SVT Ablation Discharge Instructions - Femoral     After you go home:      Have an adult stay with you until tomorrow.    You may resume your normal diet.       For 24 hours - due to the sedation you received:    Relax and take it easy.    Do NOT make any important or legal decisions.    Do NOT drive or operate machines at home or at work.    Do NOT drink alcohol.    Care of Groin Puncture Site:      For the first 24 hrs - check the puncture site every 1-2 hours while awake.    For 2 days, when you cough, sneeze, laugh or move your bowels, hold your hand over the puncture site and press firmly.    Remove the bandaid after 24 hours. If there is minor oozing, apply another bandaid and remove it after 12 hours.    It is normal to have a small bruise or pea size lump at the site.    You may shower tomorrow.  Do NOT take a bath, or use a hot tub or pool for at least 3 days. Do NOT scrub the site. Do not use lotion or powder near the puncture site.    Activity:            For 2 days:    No stooping or squatting    Do NOT do any heavy activity such as exercise, lifting, or straining.     No housework, yard work or any activity that make you sweat    Do NOT lift more than 10 pounds    Bleeding:      If you start bleeding from the site in your groin, lie down flat and press firmly on the site for 10 minutes.     Once bleeding stops, lay flat for 2 hours.    Call [facility] as soon as you can.       Call 911 right away if you have heavy bleeding or bleeding that does not stop.      Medicines:    If you have stopped any medicines, check with your provider about when to restart them.    If you have pain or shortness of breath, you may take Advil (ibuprofen) or Tylenol (acetaminophen).    Follow Up Appointments:      Follow up with Device Clinic at [facility] of patient preference in 7-10 days.    Call the clinic if:      You have increased pain or a large or growing hard lump around the site.    The site is red,  swollen, hot or tender.    Blood or fluid is draining from the site.    You have chills or a fever greater than 101 F (38 C).    Your leg feels numb, cool or changes color.    Increased pain in the chest and/or groin.    Increased shortness of breath    Chest pain not relieved by Tylenol or Advil    New pain in the back or belly that you cannot control with Tylenol.    Recurrent irregular or fast heart rate lasting over 2 hours.    Any questions or concerns.    Heart rhythms:    You may have some irregular heartbeats. These feel very strong. They may make you feel that the fast heart rhythm is going to start again.  Give it time. The irregular beats should occur less often.       [facility] at [place]:            [phone] [facility] (7 days a week)

## 2017-09-06 NOTE — IP AVS SNAPSHOT
MRN:[id number]                      After Visit Summary   9/6/2017    Linda Serrano    MRN: [id number]           Visit Information        Department      9/6/2017 10:50 AM [facility]          Review of your medicines      Notice     You have not been prescribed any medications.             Protect others around you: Learn how to safely use, store and throw away your medicines at www.disposemymeds.org.         Follow-ups after your visit         Care Instructions        After Care Instructions     Discharge Instructions - Follow up with [facility] Nurse Practitioner          Follow up with EP Nurse Practitioner at [facility] Clinic of patient preference in 6-7 weeks.                  Further instructions from your care team       SVT Ablation Discharge Instructions - Femoral     After you go home:      Have an adult stay with you until tomorrow.    You may resume your normal diet.       For 24 hours - due to the sedation you received:    Relax and take it easy.    Do NOT make any important or legal decisions.    Do NOT drive or operate machines at home or at work.    Do NOT drink alcohol.    Care of Groin Puncture Site:      For the first 24 hrs - check the puncture site every 1-2 hours while awake.    For 2 days, when you cough, sneeze, laugh or move your bowels, hold your hand over the puncture site and press firmly.    Remove the bandaid after 24 hours. If there is minor oozing, apply another bandaid and remove it after 12 hours.    It is normal to have a small bruise or pea size lump at the site.    You may shower tomorrow.  Do NOT take a bath, or use a hot tub or pool for at least 3 days. Do NOT scrub the site. Do not use lotion or powder near the puncture site.    Activity:            For 2 days:    No stooping or squatting    Do NOT do any heavy activity such as exercise, lifting, or straining.     No housework, yard work or any activity that make you sweat    Do NOT lift  "more than 10 pounds    Bleeding:      If you start bleeding from the site in your groin, lie down flat and press firmly on the site for 10 minutes.     Once bleeding stops, lay flat for 2 hours.    Call [facility] as soon as you can.       Call 911 right away if you have heavy bleeding or bleeding that does not stop.      Medicines:    If you have stopped any medicines, check with your provider about when to restart them.    If you have pain or shortness of breath, you may take Advil (ibuprofen) or Tylenol (acetaminophen).    Follow Up Appointments:      Follow up with Device Clinic at [facility] of patient preference in 7-10 days.    Call the clinic if:      You have increased pain or a large or growing hard lump around the site.    The site is red, swollen, hot or tender.    Blood or fluid is draining from the site.    You have chills or a fever greater than 101 F (38 C).    Your leg feels numb, cool or changes color.    Increased pain in the chest and/or groin.    Increased shortness of breath    Chest pain not relieved by Tylenol or Advil    New pain in the back or belly that you cannot control with Tylenol.    Recurrent irregular or fast heart rate lasting over 2 hours.    Any questions or concerns.    Heart rhythms:    You may have some irregular heartbeats. These feel very strong. They may make you feel that the fast heart rhythm is going to start again.  Give it time. The irregular beats should occur less often.       [facility] at [place]:            [phone] [facility] (7 days a week)           Additional Information About Your Visit        NCR Information     NCR lets you send messages to your doctor, view your test results, renew your prescriptions, schedule appointments and more. To sign up, go to [email or web address] . Click on \"Log in\" on the left side of the screen, which will take you to the Welcome page. Then click on \"Sign up Now\" on the right " side of the page.     You will be asked to enter the access code listed below, as well as some personal information. Please follow the directions to create your username and password.     Your access code is: [email or web address]  Expires: 2017 12:36 PM     Your access code will  in 90 days. If you need help or a new code, please call your [facility] clinic or [phone].        Care EveryWhere ID     This is your Care EveryWhere ID. This could be used by other organizations to access your [facility] medical records  [id number]        Your Vitals Were     Blood Pressure Pulse Temperature Respirations Weight Pulse Oximetry    97/67 67 97.7  F (36.5  C) (Oral) 16 76.1 kg (167 lb 11.2 oz) 98%    BMI (Body Mass Index)                   27.07 kg/m2            Primary Care Provider Office Phone # Fax #    Fam Phys MD Dianna [phone]      Equal Access to Services     WON DELGADO : Hadii aad ku hadasho Soomaali, waaxda luqadaha, qaybta kaalmada adeegyada, waxay amyin haykesha howard . So [facility] [phone].    ATENCIÓN: Si habla español, tiene a davila disposición servicios gratuitos de asistencia lingüística. Llame al [phone].    We comply with applicable federal civil rights laws and [place] laws. We do not discriminate on the basis of race, color, national origin, age, disability sex, sexual orientation or gender identity.            Thank you!     Thank you for choosing [facility] for your care. Our goal is always to provide you with excellent care. Hearing back from our patients is one way we can continue to improve our services. Please take a few minutes to complete the written survey that you may receive in the mail after you visit with us. Thank you!             Medication List: This is a list of all your medications and when to take them. Check marks below indicate your daily home schedule. Keep this list as a reference.      Notice     You have not been prescribed any  medications.

## 2017-09-06 NOTE — IP AVS SNAPSHOT
[place] [address]    [place] [address]    Phone:  [phone]                                       After Visit Summary   9/6/2017    Linda Serrano    MRN: [id number]           After Visit Summary Signature Page     I have received my discharge instructions, and my questions have been answered. I have discussed any challenges I see with this plan with the nurse or doctor.    ..........................................................................................................................................  Patient/Patient Representative Signature      ..........................................................................................................................................  Patient Representative Print Name and Relationship to Patient    ..................................................               ................................................  Date                                            Time    ..........................................................................................................................................  Reviewed by Signature/Title    ...................................................              ..............................................  Date                                                            Time

## 2017-09-06 NOTE — PROGRESS NOTES
1655 Report received from Kvng Swanson RN.  1715 Bandaid CDI to right groin puncture sites. No oozing or hematoma noted. Area soft & flat. Pt denies pain. Pt instructed on activity restrictions while on bedrest. Verbal understanding received from pt. No family present at this time.  bedside.    1745 Pt taking diet & flds well. No complaints.  1840 Discharge teaching & instructions given to both pt &  w/ verbal understanding received.  present. All questions & concerns addressed.  1900 OOB - steady on feet. Ambulated in halls to bathroom with good pavel. No change in puncture site assessment with activity.

## 2017-09-06 NOTE — PROCEDURES
Dictated.  Inducible AVNRT.  Successful ablation.  No apparent complication.    Plan:  - bedrest x 4 hrs  - home later tonight, if all is well

## 2017-09-07 ENCOUNTER — DOCUMENTATION ONLY (OUTPATIENT)
Dept: CARDIOLOGY | Facility: CLINIC | Age: 27
End: 2017-09-07

## 2017-09-07 NOTE — PROGRESS NOTES
Patient has overdue order for TSH study, per Dr. Velazquez's dictation this was to be drawn if no previous TSH with OB/Gyn. Notes sent in June did not have a TSH listed. Left message for Medical records team at [facility] to see if any TSH done since then.    Call from [facility] - they have no TSH on file for this patient. Message to scheduling to add labs to OV 10/16/17

## 2017-09-12 LAB — INTERPRETATION ECG - MUSE: NORMAL

## 2017-10-16 ENCOUNTER — OFFICE VISIT (OUTPATIENT)
Dept: CARDIOLOGY | Facility: CLINIC | Age: 27
End: 2017-10-16

## 2017-10-16 VITALS
HEIGHT: 66 IN | SYSTOLIC BLOOD PRESSURE: 117 MMHG | BODY MASS INDEX: 27.53 KG/M2 | DIASTOLIC BLOOD PRESSURE: 82 MMHG | WEIGHT: 171.3 LBS | HEART RATE: 76 BPM

## 2017-10-16 DIAGNOSIS — R00.2 PALPITATIONS: ICD-10-CM

## 2017-10-16 DIAGNOSIS — I47.10 SVT (SUPRAVENTRICULAR TACHYCARDIA) (H): Primary | ICD-10-CM

## 2017-10-16 LAB — TSH SERPL DL<=0.005 MIU/L-ACNC: 1.32 MU/L (ref 0.4–4)

## 2017-10-16 PROCEDURE — 93000 ELECTROCARDIOGRAM COMPLETE: CPT | Performed by: PHYSICIAN ASSISTANT

## 2017-10-16 PROCEDURE — 36415 COLL VENOUS BLD VENIPUNCTURE: CPT | Performed by: INTERNAL MEDICINE

## 2017-10-16 PROCEDURE — 99212 OFFICE O/P EST SF 10 MIN: CPT | Mod: 25 | Performed by: PHYSICIAN ASSISTANT

## 2017-10-16 PROCEDURE — 84443 ASSAY THYROID STIM HORMONE: CPT | Performed by: INTERNAL MEDICINE

## 2017-10-16 NOTE — LETTER
10/16/2017    JAMIE MEJIA MD  [address]  [facility] [address]    RE: Linda Serrano       Dear Colleague,    I had the pleasure of seeing Linda Zach in the [facility].    HPI:   I had the pleasure of seeing Linda today when she came for routine follow-up following her AVNRT ablation. She is a pleasant 27-year-old who had palpitations starting when she was 10 years old. She was on a beta blocker but did not tolerate this due to fatigue. Dr. Velazquez saw her and recommended an echocardiogram (normal) and an event monitor. This showed evidence of SVT up to 223 beats a minute work which was symptomatic. As she was pregnant when this was formally diagnosed, she opted to avoid medical therapy or ablation as she had never become dizzy or syncopal with it. 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 She was discharged home the same day and comes back for routine follow-up.    She states she's done well. She is not had any recurrent arrhythmias. She denies right groin site discomfort, chest pain, pressure, tightness, edema or any other concerns. A few days ago, she did have an episode of lightheadedness but states it was not related with palpitations or shortness of breath. Her blood pressure was 120/80 when she checked it and temperature was 94    TSH today was normal @ 1.32  EKG today which I over read showed sinus rhythm at 65 bpm.    Assessment & Plan:    1. AVNRT - she underwent slow pathway of the AV node modification by Dr. Oneal 9/6. Follow-up EKG today looks good. She's not had recurrent arrhythmias and Dr. Oneal quoted a 98% chance of success.    At this time, I admitted no formal follow-up plans with her, but encouraged her to contact us should she get any recurrent palpitations, had concerns or other symptoms she would like evaluated.    Kate Dowell PA-C, MSPAS      Orders Placed This Encounter   Procedures      EKG 12-lead complete w/read - Clinics (performed today)     No orders of the defined types were placed in this encounter.    There are no discontinued medications.      Encounter Diagnoses   Name Primary?     Palpitations Yes     SVT (supraventricular tachycardia) (H)        CURRENT MEDICATIONS:  No current outpatient prescriptions on file.       ALLERGIES   No Known Allergies    PAST MEDICAL HISTORY:  Past Medical History:   Diagnosis Date     Palpitations      S/P ablation of ventricular arrhythmia 09/06/2017     SVT (supraventricular tachycardia) (H)        PAST SURGICAL HISTORY:  Past Surgical History:   Procedure Laterality Date     DRAIN SKIN ABSCESS SIMPLE      left thumb     STRESS ECHO ([facility])  1/2010    Normal       FAMILY HISTORY:  Family History   Problem Relation Age of Onset     Coronary Artery Disease No family hx of      Asthma No family hx of      C.A.D. No family hx of      DIABETES Father      Hypertension Mother      Breast Cancer No family hx of      Cancer - colorectal No family hx of      Anesthesia Reaction No family hx of      Blood Disease No family hx of      Eye Disorder No family hx of      OSTEOPOROSIS No family hx of      Thyroid Disease No family hx of      DIABETES Mother        SOCIAL HISTORY:  Social History     Social History     Marital status:      Spouse name: N/A     Number of children: N/A     Years of education: N/A     Occupational History     Student      Social History Main Topics     Smoking status: Never Smoker     Smokeless tobacco: Never Used     Alcohol use No     Drug use: No     Sexual activity: No     Other Topics Concern     Caffeine Concern Yes     tea or coffee 2-3 per day     Special Diet No     Exercise No     push ups and sit ups     Bike Helmet Not Asked     NA     Social History Narrative    ** Merged History Encounter **         Eats fruits and vegetables every day. Calcium and vitamin D supplements recommended.       Review of Systems:  Skin:   "Negative     Eyes:  Negative    ENT:  Negative    Respiratory:  Negative for shortness of breath;dyspnea on exertion  Cardiovascular:  Negative for;palpitations;edema;syncope or near-syncope;dizziness;lightheadedness;fatigue Positive for;lower extremity symptoms  Gastroenterology: Negative    Genitourinary:  Negative    Musculoskeletal:  Negative    Neurologic:  Negative    Psychiatric:  Negative    Heme/Lymph/Imm:  Negative    Endocrine:  Negative      Physical Exam:  Vitals: /82  Pulse 76  Ht 1.676 m (5' 5.98\")  Wt 77.7 kg (171 lb 4.8 oz)  BMI 27.66 kg/m2    Constitutional:  cooperative;well developed;in no acute distress        Skin:  warm and dry to the touch        Head:  normocephalic, no masses or lesions        Eyes:  sclera white;pupils equal and round;conjunctivae and lids unremarkable;no xanthalasma        ENT:  no pallor or cyanosis        Neck:  JVP normal        Chest:  normal breath sounds, clear to auscultation, normal A-P diameter, normal symmetry, normal respiratory excursion, no use of accessory muscles        Cardiac: regular rhythm;normal S1 and S2                  Abdomen:  not assessed this visit        Vascular: pulses full and equal                                 No tenderness R groin    Extremities and Back:  no deformities, clubbing, cyanosis, erythema observed        Neurological:  affect appropriate          Recent Lab Results:  LIPID RESULTS:  Lab Results   Component Value Date    CHOL 179 01/25/2011       LIVER ENZYME RESULTS:  No results found for: AST, ALT    CBC RESULTS:  Lab Results   Component Value Date    WBC 5.1 09/06/2017    RBC 4.62 09/06/2017    HGB 12.9 09/06/2017    HCT 39.6 09/06/2017    MCV 86 09/06/2017    MCH 27.9 09/06/2017    MCHC 32.6 09/06/2017    RDW 15.6 (H) 09/06/2017     09/06/2017       BMP RESULTS:  Lab Results   Component Value Date     09/06/2017    POTASSIUM 4.0 09/06/2017    CHLORIDE 107 09/06/2017    CO2 27 09/06/2017    ANIONGAP " 5 09/06/2017    GLC 84 09/06/2017    BUN 9 09/06/2017    CR 0.79 09/06/2017    GFRESTIMATED 87 09/06/2017    GFRESTBLACK >90 09/06/2017    TIFFANI 9.3 09/06/2017        A1C RESULTS:  No results found for: A1C    INR RESULTS:  No results found for: INR    Thank you for allowing me to participate in the care of your patient.    Sincerely,     Raeann Dowell PA-C     [facility]

## 2017-10-16 NOTE — PATIENT INSTRUCTIONS
1. EKG today looked fine!    2. Reviewed ablation procedure - 98% chance this will not come back!    3. Thyroid level today looked good    4. No need to see us back but CALL if any issues with recurrent palpitations, concerns, dizziness, etc.  My nurses are Italia/Pat: [phone]

## 2017-10-16 NOTE — MR AVS SNAPSHOT
"              After Visit Summary   10/16/2017    Linda Serrano    MRN: [id number]           Patient Information     Date Of Birth          1990        Visit Information        Provider Department      10/16/2017 1:00 PM Raeann Dowell PA-C; [place] [employer] [facility] [place]        Today's Diagnoses     Palpitations    -  1    SVT (supraventricular tachycardia) (H)          Care Instructions    1. EKG today looked fine!    2. Reviewed ablation procedure - 98% chance this will not come back!    3. Thyroid level today looked good    4. No need to see us back but CALL if any issues with recurrent palpitations, concerns, dizziness, etc.  My nurses are Italia/Krystal: [phone]          Follow-ups after your visit        Who to contact     If you have questions or need follow up information about today's clinic visit or your schedule please contact [facility] [place] directly at [phone].  Normal or non-critical lab and imaging results will be communicated to you by [employer], letter or phone within 4 business days after the clinic has received the results. If you do not hear from us within 7 days, please contact the clinic through [employer] or phone. If you have a critical or abnormal lab result, we will notify you by phone as soon as possible.  Submit refill requests through [employer] or call your pharmacy and they will forward the refill request to us. Please allow 3 business days for your refill to be completed.          Additional Information About Your Visit        [employer] Information     [employer] lets you send messages to your doctor, view your test results, renew your prescriptions, schedule appointments and more. To sign up, go to [email or web address] . Click on \"Log in\" on the left side of the screen, which will take you to the Welcome page. Then click on \"Sign up Now\" on the right side of the page.     You will be " "asked to enter the access code listed below, as well as some personal information. Please follow the directions to create your username and password.     Your access code is: [email or web address]  Expires: 2018  1:14 PM     Your access code will  in 90 days. If you need help or a new code, please call your [facility] clinic or [phone].        Care EveryWhere ID     This is your Care EveryWhere ID. This could be used by other organizations to access your [facility] medical records  [id number]        Your Vitals Were     Pulse Height BMI (Body Mass Index)             76 1.676 m (5' 5.98\") 27.66 kg/m2          Blood Pressure from Last 3 Encounters:   10/16/17 117/82   17 106/71   17 92/62    Weight from Last 3 Encounters:   10/16/17 77.7 kg (171 lb 4.8 oz)   17 76.1 kg (167 lb 11.2 oz)   17 75.3 kg (166 lb)              We Performed the Following     EKG 12-lead complete w/read - Clinics (performed today)        Primary Care Provider Office Phone # Fax #    Fam Phys Left Hand, [phone] [address]  [facility] [address]        Equal Access to Services     JYOTSNA DELGADO : Hadii karmen watts hadasho Soomaali, waaxda luqadaha, qaybta kaalmada adesarah, yair lowry. So [facility] [phone].    ATENCIÓN: Si habla español, tiene a davila disposición servicios gratuitos de asistencia lingüística. Jose Alberto al [phone].    We comply with applicable federal civil rights laws and [place] laws. We do not discriminate on the basis of race, color, national origin, age, disability, sex, sexual orientation, or gender identity.            Thank you!     Thank you for choosing [facility] [place]  for your care. Our goal is always to provide you with excellent care. Hearing back from our patients is one way we can continue to improve our services. Please take a few minutes to complete the written survey " that you may receive in the mail after your visit with us. Thank you!             Your Updated Medication List - Protect others around you: Learn how to safely use, store and throw away your medicines at www.disposemymeds.org.      Notice  As of 10/16/2017  1:14 PM    You have not been prescribed any medications.

## 2017-10-16 NOTE — PROGRESS NOTES
HPI:   I had the pleasure of seeing Linda today when she came for routine follow-up following her AVNRT ablation. She is a pleasant 27-year-old who had palpitations starting when she was 10 years old. She was on a beta blocker but did not tolerate this due to fatigue. Dr. Velazquez saw her and recommended an echocardiogram (normal) and an event monitor. This showed evidence of SVT up to 223 beats a minute work which was symptomatic. As she was pregnant when this was formally diagnosed, she opted to avoid medical therapy or ablation as she had never become dizzy or syncopal with it. 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 She was discharged home the same day and comes back for routine follow-up.    She states she's done well. She is not had any recurrent arrhythmias. She denies right groin site discomfort, chest pain, pressure, tightness, edema or any other concerns. A few days ago, she did have an episode of lightheadedness but states it was not related with palpitations or shortness of breath. Her blood pressure was 120/80 when she checked it and temperature was 94    TSH today was normal @ 1.32  EKG today which I over read showed sinus rhythm at 65 bpm.    Assessment & Plan:    1. AVNRT - she underwent slow pathway of the AV node modification by Dr. Oneal 9/6. Follow-up EKG today looks good. She's not had recurrent arrhythmias and Dr. Oneal quoted a 98% chance of success.    At this time, I admitted no formal follow-up plans with her, but encouraged her to contact us should she get any recurrent palpitations, had concerns or other symptoms she would like evaluated.    Kate Dowell PA-C, MSPAS      Orders Placed This Encounter   Procedures     EKG 12-lead complete w/read - Clinics (performed today)     No orders of the defined types were placed in this encounter.    There are no discontinued medications.      Encounter Diagnoses   Name Primary?     Palpitations Yes     SVT  (supraventricular tachycardia) (H)        CURRENT MEDICATIONS:  No current outpatient prescriptions on file.       ALLERGIES   No Known Allergies    PAST MEDICAL HISTORY:  Past Medical History:   Diagnosis Date     Palpitations      S/P ablation of ventricular arrhythmia 09/06/2017     SVT (supraventricular tachycardia) (H)        PAST SURGICAL HISTORY:  Past Surgical History:   Procedure Laterality Date     DRAIN SKIN ABSCESS SIMPLE      left thumb     STRESS ECHO ([facility])  1/2010    Normal       FAMILY HISTORY:  Family History   Problem Relation Age of Onset     Coronary Artery Disease No family hx of      Asthma No family hx of      C.A.D. No family hx of      DIABETES Father      Hypertension Mother      Breast Cancer No family hx of      Cancer - colorectal No family hx of      Anesthesia Reaction No family hx of      Blood Disease No family hx of      Eye Disorder No family hx of      OSTEOPOROSIS No family hx of      Thyroid Disease No family hx of      DIABETES Mother        SOCIAL HISTORY:  Social History     Social History     Marital status:      Spouse name: N/A     Number of children: N/A     Years of education: N/A     Occupational History     Student      Social History Main Topics     Smoking status: Never Smoker     Smokeless tobacco: Never Used     Alcohol use No     Drug use: No     Sexual activity: No     Other Topics Concern     Caffeine Concern Yes     tea or coffee 2-3 per day     Special Diet No     Exercise No     push ups and sit ups     Bike Helmet Not Asked     NA     Social History Narrative    ** Merged History Encounter **         Eats fruits and vegetables every day. Calcium and vitamin D supplements recommended.       Review of Systems:  Skin:  Negative     Eyes:  Negative    ENT:  Negative    Respiratory:  Negative for shortness of breath;dyspnea on exertion  Cardiovascular:  Negative for;palpitations;edema;syncope or near-syncope;dizziness;lightheadedness;fatigue Positive  "for;lower extremity symptoms  Gastroenterology: Negative    Genitourinary:  Negative    Musculoskeletal:  Negative    Neurologic:  Negative    Psychiatric:  Negative    Heme/Lymph/Imm:  Negative    Endocrine:  Negative      Physical Exam:  Vitals: /82  Pulse 76  Ht 1.676 m (5' 5.98\")  Wt 77.7 kg (171 lb 4.8 oz)  BMI 27.66 kg/m2    Constitutional:  cooperative;well developed;in no acute distress        Skin:  warm and dry to the touch        Head:  normocephalic, no masses or lesions        Eyes:  sclera white;pupils equal and round;conjunctivae and lids unremarkable;no xanthalasma        ENT:  no pallor or cyanosis        Neck:  JVP normal        Chest:  normal breath sounds, clear to auscultation, normal A-P diameter, normal symmetry, normal respiratory excursion, no use of accessory muscles        Cardiac: regular rhythm;normal S1 and S2                  Abdomen:  not assessed this visit        Vascular: pulses full and equal                                 No tenderness R groin    Extremities and Back:  no deformities, clubbing, cyanosis, erythema observed        Neurological:  affect appropriate          Recent Lab Results:  LIPID RESULTS:  Lab Results   Component Value Date    CHOL 179 01/25/2011       LIVER ENZYME RESULTS:  No results found for: AST, ALT    CBC RESULTS:  Lab Results   Component Value Date    WBC 5.1 09/06/2017    RBC 4.62 09/06/2017    HGB 12.9 09/06/2017    HCT 39.6 09/06/2017    MCV 86 09/06/2017    MCH 27.9 09/06/2017    MCHC 32.6 09/06/2017    RDW 15.6 (H) 09/06/2017     09/06/2017       BMP RESULTS:  Lab Results   Component Value Date     09/06/2017    POTASSIUM 4.0 09/06/2017    CHLORIDE 107 09/06/2017    CO2 27 09/06/2017    ANIONGAP 5 09/06/2017    GLC 84 09/06/2017    BUN 9 09/06/2017    CR 0.79 09/06/2017    GFRESTIMATED 87 09/06/2017    GFRESTBLACK >90 09/06/2017    TIFFANI 9.3 09/06/2017        A1C RESULTS:  No results found for: A1C    INR RESULTS:  No results " found for: INR

## 2018-02-16 NOTE — IP AVS SNAPSHOT
[facility]    [address]    [facility] [address]    Phone:  [phone]    Fax:  [phone]                                       After Visit Summary   6/25/2017    Linda Serrano    MRN: [id number]           After Visit Summary Signature Page     I have received my discharge instructions, and my questions have been answered. I have discussed any challenges I see with this plan with the nurse or doctor.    ..........................................................................................................................................  Patient/Patient Representative Signature      ..........................................................................................................................................  Patient Representative Print Name and Relationship to Patient    ..................................................               ................................................  Date                                            Time    ..........................................................................................................................................  Reviewed by Signature/Title    ...................................................              ..............................................  Date                                                            Time           Abdominal Pain, N/V/D

## 2018-08-30 LAB
C TRACH DNA SPEC QL PROBE+SIG AMP: NEGATIVE
HBV SURFACE AG SERPL QL IA: NEGATIVE
HIV 1+2 AB+HIV1 P24 AG SERPL QL IA: NEGATIVE
N GONORRHOEA DNA SPEC QL PROBE+SIG AMP: NEGATIVE
RUBELLA ANTIBODY IGG QUANTITATIVE: NORMAL IU/ML

## 2019-01-10 LAB — GROUP B STREP PCR: NEGATIVE

## 2019-01-25 ENCOUNTER — OFFICE VISIT (OUTPATIENT)
Dept: CARDIOLOGY | Facility: CLINIC | Age: 29
End: 2019-01-25
Payer: COMMERCIAL

## 2019-01-25 ENCOUNTER — HOSPITAL ENCOUNTER (OUTPATIENT)
Dept: CARDIOLOGY | Facility: CLINIC | Age: 29
Discharge: HOME | End: 2019-01-25
Attending: INTERNAL MEDICINE | Admitting: INTERNAL MEDICINE
Payer: COMMERCIAL

## 2019-01-25 VITALS
OXYGEN SATURATION: 100 % | HEART RATE: 97 BPM | SYSTOLIC BLOOD PRESSURE: 112 MMHG | BODY MASS INDEX: 29.73 KG/M2 | WEIGHT: 185 LBS | HEIGHT: 66 IN | DIASTOLIC BLOOD PRESSURE: 76 MMHG

## 2019-01-25 DIAGNOSIS — R00.2 PALPITATIONS: ICD-10-CM

## 2019-01-25 DIAGNOSIS — R00.2 PALPITATIONS: Primary | ICD-10-CM

## 2019-01-25 PROCEDURE — 99214 OFFICE O/P EST MOD 30 MIN: CPT | Performed by: INTERNAL MEDICINE

## 2019-01-25 PROCEDURE — 93000 ELECTROCARDIOGRAM COMPLETE: CPT | Performed by: INTERNAL MEDICINE

## 2019-01-25 PROCEDURE — 93226 XTRNL ECG REC<48 HR SCAN A/R: CPT

## 2019-01-25 PROCEDURE — 93227 XTRNL ECG REC<48 HR R&I: CPT | Performed by: INTERNAL MEDICINE

## 2019-01-25 ASSESSMENT — MIFFLIN-ST. JEOR: SCORE: 1585.9

## 2019-01-25 NOTE — PROGRESS NOTES
Service Date: 2019      HISTORY OF PRESENT ILLNESS:  Ms. Mullins is a pleasant 28-year-old female with a history of AV nixon reentrant tachycardia, status post AV nixon reentrant tachycardia ablation in 2017, presents back to Cardiology due to recurrent palpitations.      I saw the patient for the first time on 2017, and a monitor eventually showed AVNRT for which she underwent ablation on 2017.  She states following the ablation she no longer had palpitations.  She is currently now 38 weeks pregnant and over the last several weeks has been noticing that her pulse is around 120 beats per minute in the evening.  We performed a 12-lead ECG in the office today showing sinus rhythm.  She states that this is not similar to her prior SVT palpitations.      She is otherwise well from a cardiovascular standpoint, denying any chest pain, pressure, shortness of breath, orthopnea or paroxysmal nocturnal dyspnea.      IMPRESSION AND PLAN:  Palpitations -- I suspect this is not recurrent AVNRT.  However, to exclude this she is going to undergo a 48-hour Holter monitor.  She will follow back with Kate Dowell from [facility] thereafter.  In the interim, I have asked her to contact her OB/GYN to make sure her hemoglobin is not significantly low which may explain the tachycardia.  A TSH over the fall was normal.  Further recommendations will be based on the results of the Holter monitor.         LIANG GUTIERRES MD             D: 2019   T: 2019   MT: ELVIA      Name:     IRAJ MULLINS   MRN:      [id number]        Account:      [id number]   :      1990           Service Date: 2019      Document: [id number]

## 2019-01-25 NOTE — LETTER
2019      JAMIE MEJIA MD  [address]  [facility] [address]      RE: Iraj Montemayorambrosekimberli       Dear Colleague,    I had the pleasure of seeing Iraj Mullins in the [facility].    Service Date: 2019      HISTORY OF PRESENT ILLNESS:  Ms. Mullins is a pleasant 28-year-old female with a history of AV nixon reentrant tachycardia, status post AV nixon reentrant tachycardia ablation in 2017, presents back to Cardiology due to recurrent palpitations.      I saw the patient for the first time on 2017, and a monitor eventually showed AVNRT for which she underwent ablation on 2017.  She states following the ablation she no longer had palpitations.  She is currently now 38 weeks pregnant and over the last several weeks has been noticing that her pulse is around 120 beats per minute in the evening.  We performed a 12-lead ECG in the office today showing sinus rhythm.  She states that this is not similar to her prior SVT palpitations.      She is otherwise well from a cardiovascular standpoint, denying any chest pain, pressure, shortness of breath, orthopnea or paroxysmal nocturnal dyspnea.      IMPRESSION AND PLAN:  Palpitations -- I suspect this is not recurrent AVNRT.  However, to exclude this she is going to undergo a 48-hour Holter monitor.  She will follow back with Kate Dowell from [facility] thereafter.  In the interim, I have asked her to contact her OB/GYN to make sure her hemoglobin is not significantly low which may explain the tachycardia.  A TSH over the fall was normal.  Further recommendations will be based on the results of the Holter monitor.         LIANG GUTIERRES MD             D: 2019   T: 2019   MT: ELVIA      Name:     IRAJ MULLINS   MRN:      [id number]        Account:      [id number]   :      1990           Service Date: 2019      Document: [id number]         No facility-administered encounter  medications on file as of 2019.      Outpatient Encounter Medications as of 2019   Medication Sig Dispense Refill     Prenatal Multivit-Min-Fe-FA (PRE- FORMULA PO) Take 1 tablet by mouth daily         Again, thank you for allowing me to participate in the care of your patient.      Sincerely,    Ayo Velazquez MD     [facility]

## 2019-01-25 NOTE — LETTER
2019    JAMIE MEJIA MD  [address]  [facility] [address]    RE: Linda Serrano       Dear Colleague,    I had the pleasure of seeing Linda Serrano in the [facility].    HPI and Plan:   See dictation    Orders Placed This Encounter   Procedures     Follow-Up with Cardiac Advanced Practice Provider     EKG 12-lead complete w/read - Clinics (performed today)     Holter Monitor 48 hour Adult Pediatric       Orders Placed This Encounter   Medications     Prenatal Multivit-Min-Fe-FA (PRE-TYRONE FORMULA PO)     Sig: Take 1 tablet by mouth daily       There are no discontinued medications.      Encounter Diagnosis   Name Primary?     Palpitations Yes       CURRENT MEDICATIONS:  Current Outpatient Medications   Medication Sig Dispense Refill     Prenatal Multivit-Min-Fe-FA (PRE-TYRONE FORMULA PO) Take 1 tablet by mouth daily         ALLERGIES   No Known Allergies    PAST MEDICAL HISTORY:  Past Medical History:   Diagnosis Date     Palpitations      S/P ablation of ventricular arrhythmia 2017     SVT (supraventricular tachycardia) (H)        PAST SURGICAL HISTORY:  Past Surgical History:   Procedure Laterality Date     DRAIN SKIN ABSCESS SIMPLE      left thumb     STRESS ECHO ([facility])  2010    Normal       FAMILY HISTORY:  Family History   Problem Relation Age of Onset     Coronary Artery Disease No family hx of      Asthma No family hx of      C.A.D. No family hx of      Diabetes Father      Hypertension Mother      Breast Cancer No family hx of      Cancer - colorectal No family hx of      Anesthesia Reaction No family hx of      Blood Disease No family hx of      Eye Disorder No family hx of      Osteoporosis No family hx of      Thyroid Disease No family hx of      Diabetes Mother        SOCIAL HISTORY:  Social History     Socioeconomic History     Marital status:      Spouse name: None     Number of children: None     Years of education:  "None     Highest education level: None   Social Needs     Financial resource strain: None     Food insecurity - worry: None     Food insecurity - inability: None     Transportation needs - medical: None     Transportation needs - non-medical: None   Occupational History     Occupation: Student   Tobacco Use     Smoking status: Never Smoker     Smokeless tobacco: Never Used   Substance and Sexual Activity     Alcohol use: No     Drug use: No     Sexual activity: No   Other Topics Concern     Parent/sibling w/ CABG, MI or angioplasty before 65F 55M? Not Asked      Service Not Asked     Blood Transfusions Not Asked     Caffeine Concern Yes     Comment: tea or coffee 2-3 per day     Occupational Exposure Not Asked     Hobby Hazards Not Asked     Sleep Concern Not Asked     Stress Concern Not Asked     Weight Concern Not Asked     Special Diet No     Back Care Not Asked     Exercise No     Comment: push ups and sit ups     Bike Helmet Not Asked     Comment: NA     Seat Belt Not Asked     Self-Exams Not Asked   Social History Narrative    ** Merged History Encounter **         Eats fruits and vegetables every day. Calcium and vitamin D supplements recommended.       Review of Systems:  Skin:  Negative       Eyes:  Negative      ENT:  Negative      Respiratory:  Positive for shortness of breath;dyspnea on exertion     Cardiovascular:  Negative for;palpitations;edema;syncope or near-syncope;dizziness;lightheadedness;fatigue Positive for;lower extremity symptoms;palpitations See HPI  Gastroenterology: Negative heartburn    Genitourinary:  Negative      Musculoskeletal:  Negative nocturnal cramping    Neurologic:  Negative      Psychiatric:  Negative      Heme/Lymph/Imm:  Negative      Endocrine:  Negative        Physical Exam:  Vitals: /76   Pulse 97   Ht 1.676 m (5' 6\")   Wt 83.9 kg (185 lb)   SpO2 100%   BMI 29.86 kg/m       Constitutional:  cooperative;in no acute distress        Skin:  warm and dry to " the touch          Head:  normocephalic        Eyes:  sclera white        Lymph:No Cervical lymphadenopathy present     ENT:  no pallor or cyanosis        Neck:  no stiffness        Respiratory:  clear to auscultation         Cardiac: regular rhythm                pulses full and equal                                        GI:  abdomen soft        Extremities and Muscular Skeletal:      trace          Neurological:  affect appropriate        Psych:  Alert and Oriented x 3        CC  Des Bustamante MD  [address] [place] [id number]                Thank you for allowing me to participate in the care of your patient.      Sincerely,     Ayo Velazquez MD     [facility]    cc:   Des Bustamante MD  [address] [place] [id number]

## 2019-01-25 NOTE — PROGRESS NOTES
HPI and Plan:   See dictation    Orders Placed This Encounter   Procedures     Follow-Up with Cardiac Advanced Practice Provider     EKG 12-lead complete w/read - Clinics (performed today)     Holter Monitor 48 hour Adult Pediatric       Orders Placed This Encounter   Medications     Prenatal Multivit-Min-Fe-FA (PRE- FORMULA PO)     Sig: Take 1 tablet by mouth daily       There are no discontinued medications.      Encounter Diagnosis   Name Primary?     Palpitations Yes       CURRENT MEDICATIONS:  Current Outpatient Medications   Medication Sig Dispense Refill     Prenatal Multivit-Min-Fe-FA (PRE- FORMULA PO) Take 1 tablet by mouth daily         ALLERGIES   No Known Allergies    PAST MEDICAL HISTORY:  Past Medical History:   Diagnosis Date     Palpitations      S/P ablation of ventricular arrhythmia 2017     SVT (supraventricular tachycardia) (H)        PAST SURGICAL HISTORY:  Past Surgical History:   Procedure Laterality Date     DRAIN SKIN ABSCESS SIMPLE      left thumb     STRESS ECHO ([facility])  2010    Normal       FAMILY HISTORY:  Family History   Problem Relation Age of Onset     Coronary Artery Disease No family hx of      Asthma No family hx of      C.A.D. No family hx of      Diabetes Father      Hypertension Mother      Breast Cancer No family hx of      Cancer - colorectal No family hx of      Anesthesia Reaction No family hx of      Blood Disease No family hx of      Eye Disorder No family hx of      Osteoporosis No family hx of      Thyroid Disease No family hx of      Diabetes Mother        SOCIAL HISTORY:  Social History     Socioeconomic History     Marital status:      Spouse name: None     Number of children: None     Years of education: None     Highest education level: None   Social Needs     Financial resource strain: None     Food insecurity - worry: None     Food insecurity - inability: None     Transportation needs - medical: None     Transportation needs -  "non-medical: None   Occupational History     Occupation: Student   Tobacco Use     Smoking status: Never Smoker     Smokeless tobacco: Never Used   Substance and Sexual Activity     Alcohol use: No     Drug use: No     Sexual activity: No   Other Topics Concern     Parent/sibling w/ CABG, MI or angioplasty before 65F 55M? Not Asked      Service Not Asked     Blood Transfusions Not Asked     Caffeine Concern Yes     Comment: tea or coffee 2-3 per day     Occupational Exposure Not Asked     Hobby Hazards Not Asked     Sleep Concern Not Asked     Stress Concern Not Asked     Weight Concern Not Asked     Special Diet No     Back Care Not Asked     Exercise No     Comment: push ups and sit ups     Bike Helmet Not Asked     Comment: NA     Seat Belt Not Asked     Self-Exams Not Asked   Social History Narrative    ** Merged History Encounter **         Eats fruits and vegetables every day. Calcium and vitamin D supplements recommended.       Review of Systems:  Skin:  Negative       Eyes:  Negative      ENT:  Negative      Respiratory:  Positive for shortness of breath;dyspnea on exertion     Cardiovascular:  Negative for;palpitations;edema;syncope or near-syncope;dizziness;lightheadedness;fatigue Positive for;lower extremity symptoms;palpitations See HPI  Gastroenterology: Negative heartburn    Genitourinary:  Negative      Musculoskeletal:  Negative nocturnal cramping    Neurologic:  Negative      Psychiatric:  Negative      Heme/Lymph/Imm:  Negative      Endocrine:  Negative        Physical Exam:  Vitals: /76   Pulse 97   Ht 1.676 m (5' 6\")   Wt 83.9 kg (185 lb)   SpO2 100%   BMI 29.86 kg/m      Constitutional:  cooperative;in no acute distress        Skin:  warm and dry to the touch          Head:  normocephalic        Eyes:  sclera white        Lymph:No Cervical lymphadenopathy present     ENT:  no pallor or cyanosis        Neck:  no stiffness        Respiratory:  clear to auscultation     "     Cardiac: regular rhythm                pulses full and equal                                        GI:  abdomen soft        Extremities and Muscular Skeletal:      trace          Neurological:  affect appropriate        Psych:  Alert and Oriented x 3        CC  Fam Greg Bustamante MD  [address]  [place] [address]

## 2019-01-30 ENCOUNTER — HOSPITAL ENCOUNTER (INPATIENT)
Facility: CLINIC | Age: 29
LOS: 2 days | Discharge: HOME HEALTH | End: 2019-02-01
Attending: OBSTETRICS & GYNECOLOGY | Admitting: OBSTETRICS & GYNECOLOGY
Payer: COMMERCIAL

## 2019-01-30 LAB
ABO + RH BLD: NORMAL
ABO + RH BLD: NORMAL
ALBUMIN UR-MCNC: NEGATIVE MG/DL
AMORPH CRY #/AREA URNS HPF: ABNORMAL /HPF
AMPHETAMINES UR QL SCN: NEGATIVE
APPEARANCE UR: ABNORMAL
BILIRUB UR QL STRIP: NEGATIVE
BLD GP AB SCN SERPL QL: NORMAL
BLOOD BANK CMNT PATIENT-IMP: NORMAL
CANNABINOIDS UR QL: NEGATIVE
COCAINE UR QL: NEGATIVE
COLOR UR AUTO: YELLOW
GLUCOSE UR STRIP-MCNC: NEGATIVE MG/DL
HGB BLD-MCNC: 12.6 G/DL (ref 11.7–15.7)
HGB UR QL STRIP: NEGATIVE
KETONES UR STRIP-MCNC: NEGATIVE MG/DL
LEUKOCYTE ESTERASE UR QL STRIP: NEGATIVE
MUCOUS THREADS #/AREA URNS LPF: PRESENT /LPF
NITRATE UR QL: NEGATIVE
OPIATES UR QL SCN: NEGATIVE
PCP UR QL SCN: NEGATIVE
PH UR STRIP: 7 PH (ref 5–7)
RBC #/AREA URNS AUTO: <1 /HPF (ref 0–2)
SOURCE: ABNORMAL
SP GR UR STRIP: 1.01 (ref 1–1.03)
SPECIMEN EXP DATE BLD: NORMAL
SQUAMOUS #/AREA URNS AUTO: <1 /HPF (ref 0–1)
TRANS CELLS #/AREA URNS HPF: <1 /HPF (ref 0–1)
UROBILINOGEN UR STRIP-MCNC: 0 MG/DL (ref 0–2)
WBC #/AREA URNS AUTO: 2 /HPF (ref 0–5)

## 2019-01-30 PROCEDURE — 12000000 ZZH R&B MED SURG/OB

## 2019-01-30 PROCEDURE — 25000132 ZZH RX MED GY IP 250 OP 250 PS 637: Performed by: OBSTETRICS & GYNECOLOGY

## 2019-01-30 PROCEDURE — 86901 BLOOD TYPING SEROLOGIC RH(D): CPT | Performed by: OBSTETRICS & GYNECOLOGY

## 2019-01-30 PROCEDURE — 25000128 H RX IP 250 OP 636: Performed by: OBSTETRICS & GYNECOLOGY

## 2019-01-30 PROCEDURE — 86780 TREPONEMA PALLIDUM: CPT | Performed by: OBSTETRICS & GYNECOLOGY

## 2019-01-30 PROCEDURE — 72200001 ZZH LABOR CARE VAGINAL DELIVERY SINGLE

## 2019-01-30 PROCEDURE — G0463 HOSPITAL OUTPT CLINIC VISIT: HCPCS

## 2019-01-30 PROCEDURE — 25000125 ZZHC RX 250: Performed by: OBSTETRICS & GYNECOLOGY

## 2019-01-30 PROCEDURE — 85018 HEMOGLOBIN: CPT | Performed by: OBSTETRICS & GYNECOLOGY

## 2019-01-30 PROCEDURE — 80307 DRUG TEST PRSMV CHEM ANLYZR: CPT | Performed by: OBSTETRICS & GYNECOLOGY

## 2019-01-30 PROCEDURE — 86900 BLOOD TYPING SEROLOGIC ABO: CPT | Performed by: OBSTETRICS & GYNECOLOGY

## 2019-01-30 PROCEDURE — 59025 FETAL NON-STRESS TEST: CPT

## 2019-01-30 PROCEDURE — 10907ZC DRAINAGE OF AMNIOTIC FLUID, THERAPEUTIC FROM PRODUCTS OF CONCEPTION, VIA NATURAL OR ARTIFICIAL OPENING: ICD-10-PCS | Performed by: OBSTETRICS & GYNECOLOGY

## 2019-01-30 PROCEDURE — 86850 RBC ANTIBODY SCREEN: CPT | Performed by: OBSTETRICS & GYNECOLOGY

## 2019-01-30 PROCEDURE — 81001 URINALYSIS AUTO W/SCOPE: CPT | Performed by: OBSTETRICS & GYNECOLOGY

## 2019-01-30 RX ORDER — OXYTOCIN/0.9 % SODIUM CHLORIDE 30/500 ML
100 PLASTIC BAG, INJECTION (ML) INTRAVENOUS CONTINUOUS
Status: DISCONTINUED | OUTPATIENT
Start: 2019-01-30 | End: 2019-02-01 | Stop reason: HOSPADM

## 2019-01-30 RX ORDER — LANOLIN 100 %
OINTMENT (GRAM) TOPICAL
Status: DISCONTINUED | OUTPATIENT
Start: 2019-01-30 | End: 2019-02-01 | Stop reason: HOSPADM

## 2019-01-30 RX ORDER — IBUPROFEN 800 MG/1
800 TABLET, FILM COATED ORAL
Status: DISCONTINUED | OUTPATIENT
Start: 2019-01-30 | End: 2019-01-30

## 2019-01-30 RX ORDER — OXYTOCIN 10 [USP'U]/ML
10 INJECTION, SOLUTION INTRAMUSCULAR; INTRAVENOUS
Status: DISCONTINUED | OUTPATIENT
Start: 2019-01-30 | End: 2019-01-30

## 2019-01-30 RX ORDER — HYDROCORTISONE 2.5 %
CREAM (GRAM) TOPICAL 3 TIMES DAILY PRN
Status: DISCONTINUED | OUTPATIENT
Start: 2019-01-30 | End: 2019-02-01 | Stop reason: HOSPADM

## 2019-01-30 RX ORDER — FENTANYL CITRATE 50 UG/ML
INJECTION, SOLUTION INTRAMUSCULAR; INTRAVENOUS
Status: DISCONTINUED
Start: 2019-01-30 | End: 2019-01-30 | Stop reason: HOSPADM

## 2019-01-30 RX ORDER — FENTANYL CITRATE 50 UG/ML
50-100 INJECTION, SOLUTION INTRAMUSCULAR; INTRAVENOUS
Status: DISCONTINUED | OUTPATIENT
Start: 2019-01-30 | End: 2019-01-30

## 2019-01-30 RX ORDER — CARBOPROST TROMETHAMINE 250 UG/ML
250 INJECTION, SOLUTION INTRAMUSCULAR
Status: DISCONTINUED | OUTPATIENT
Start: 2019-01-30 | End: 2019-01-30

## 2019-01-30 RX ORDER — SODIUM CHLORIDE, SODIUM LACTATE, POTASSIUM CHLORIDE, CALCIUM CHLORIDE 600; 310; 30; 20 MG/100ML; MG/100ML; MG/100ML; MG/100ML
INJECTION, SOLUTION INTRAVENOUS CONTINUOUS
Status: DISCONTINUED | OUTPATIENT
Start: 2019-01-30 | End: 2019-01-30

## 2019-01-30 RX ORDER — AMOXICILLIN 250 MG
1 CAPSULE ORAL 2 TIMES DAILY
Status: DISCONTINUED | OUTPATIENT
Start: 2019-01-30 | End: 2019-02-01 | Stop reason: HOSPADM

## 2019-01-30 RX ORDER — METHYLERGONOVINE MALEATE 0.2 MG/ML
200 INJECTION INTRAVENOUS
Status: DISCONTINUED | OUTPATIENT
Start: 2019-01-30 | End: 2019-01-30

## 2019-01-30 RX ORDER — ACETAMINOPHEN 325 MG/1
650 TABLET ORAL EVERY 4 HOURS PRN
Status: DISCONTINUED | OUTPATIENT
Start: 2019-01-30 | End: 2019-01-30

## 2019-01-30 RX ORDER — OXYCODONE AND ACETAMINOPHEN 5; 325 MG/1; MG/1
1 TABLET ORAL
Status: DISCONTINUED | OUTPATIENT
Start: 2019-01-30 | End: 2019-01-30

## 2019-01-30 RX ORDER — OXYTOCIN/0.9 % SODIUM CHLORIDE 30/500 ML
340 PLASTIC BAG, INJECTION (ML) INTRAVENOUS CONTINUOUS PRN
Status: DISCONTINUED | OUTPATIENT
Start: 2019-01-30 | End: 2019-02-01 | Stop reason: HOSPADM

## 2019-01-30 RX ORDER — NALOXONE HYDROCHLORIDE 0.4 MG/ML
.1-.4 INJECTION, SOLUTION INTRAMUSCULAR; INTRAVENOUS; SUBCUTANEOUS
Status: DISCONTINUED | OUTPATIENT
Start: 2019-01-30 | End: 2019-02-01 | Stop reason: HOSPADM

## 2019-01-30 RX ORDER — OXYTOCIN/0.9 % SODIUM CHLORIDE 30/500 ML
100-340 PLASTIC BAG, INJECTION (ML) INTRAVENOUS CONTINUOUS PRN
Status: COMPLETED | OUTPATIENT
Start: 2019-01-30 | End: 2019-01-30

## 2019-01-30 RX ORDER — AMOXICILLIN 250 MG
2 CAPSULE ORAL 2 TIMES DAILY
Status: DISCONTINUED | OUTPATIENT
Start: 2019-01-30 | End: 2019-02-01 | Stop reason: HOSPADM

## 2019-01-30 RX ORDER — OXYTOCIN 10 [USP'U]/ML
10 INJECTION, SOLUTION INTRAMUSCULAR; INTRAVENOUS
Status: DISCONTINUED | OUTPATIENT
Start: 2019-01-30 | End: 2019-02-01 | Stop reason: HOSPADM

## 2019-01-30 RX ORDER — BISACODYL 10 MG
10 SUPPOSITORY, RECTAL RECTAL DAILY PRN
Status: DISCONTINUED | OUTPATIENT
Start: 2019-02-01 | End: 2019-02-01 | Stop reason: HOSPADM

## 2019-01-30 RX ORDER — IBUPROFEN 800 MG/1
800 TABLET, FILM COATED ORAL EVERY 6 HOURS PRN
Status: DISCONTINUED | OUTPATIENT
Start: 2019-01-30 | End: 2019-02-01 | Stop reason: HOSPADM

## 2019-01-30 RX ORDER — ONDANSETRON 2 MG/ML
4 INJECTION INTRAMUSCULAR; INTRAVENOUS EVERY 6 HOURS PRN
Status: DISCONTINUED | OUTPATIENT
Start: 2019-01-30 | End: 2019-01-30

## 2019-01-30 RX ORDER — NALOXONE HYDROCHLORIDE 0.4 MG/ML
.1-.4 INJECTION, SOLUTION INTRAMUSCULAR; INTRAVENOUS; SUBCUTANEOUS
Status: DISCONTINUED | OUTPATIENT
Start: 2019-01-30 | End: 2019-01-30

## 2019-01-30 RX ORDER — ACETAMINOPHEN 325 MG/1
650 TABLET ORAL EVERY 4 HOURS PRN
Status: DISCONTINUED | OUTPATIENT
Start: 2019-01-30 | End: 2019-02-01 | Stop reason: HOSPADM

## 2019-01-30 RX ADMIN — OXYTOCIN-SODIUM CHLORIDE 0.9% IV SOLN 30 UNIT/500ML 100 ML/HR: 30-0.9/5 SOLUTION at 16:14

## 2019-01-30 RX ADMIN — OXYTOCIN-SODIUM CHLORIDE 0.9% IV SOLN 30 UNIT/500ML 340 ML/HR: 30-0.9/5 SOLUTION at 14:00

## 2019-01-30 RX ADMIN — IBUPROFEN 800 MG: 800 TABLET, FILM COATED ORAL at 16:14

## 2019-01-30 RX ADMIN — SODIUM CHLORIDE, POTASSIUM CHLORIDE, SODIUM LACTATE AND CALCIUM CHLORIDE: 600; 310; 30; 20 INJECTION, SOLUTION INTRAVENOUS at 13:42

## 2019-01-30 RX ADMIN — SENNOSIDES AND DOCUSATE SODIUM 1 TABLET: 8.6; 5 TABLET ORAL at 21:31

## 2019-01-30 RX ADMIN — FENTANYL CITRATE 100 MCG: 50 INJECTION, SOLUTION INTRAMUSCULAR; INTRAVENOUS at 13:42

## 2019-01-30 ASSESSMENT — ACTIVITIES OF DAILY LIVING (ADL)
RETIRED_EATING: 0-->INDEPENDENT
AMBULATION: 0-->INDEPENDENT
TOILETING: 0-->INDEPENDENT
COGNITION: 0 - NO COGNITION ISSUES REPORTED
DRESS: 0-->INDEPENDENT
SWALLOWING: 0-->SWALLOWS FOODS/LIQUIDS WITHOUT DIFFICULTY
FALL_HISTORY_WITHIN_LAST_SIX_MONTHS: NO
BATHING: 0-->INDEPENDENT
RETIRED_COMMUNICATION: 0-->UNDERSTANDS/COMMUNICATES WITHOUT DIFFICULTY
TRANSFERRING: 0-->INDEPENDENT

## 2019-01-30 NOTE — PROVIDER NOTIFICATION
01/30/19 1208   Provider Notification   Provider Name/Title Dr. Dasilva   Method of Notification At Bedside   MD at bedside to AROM. AROM for clear moderate amt of fluid. SVE for MD is 5/85/-2. MD will be in house. Call when needed.

## 2019-01-30 NOTE — H&P
Linda Serrano  [id number]  OB Admit History & Physical      HPI:  Ms. Serrano  is a 28 year old  @ 39w0d by LMP who presented to L&D for evaluation of regular uterine contractions and cervical change .      Prenatal course:  1st visit at 17  1/7 weeks, regular care, TWG 26#.    Pregnancy complications:  Late to prenatal care  (insurance issues), short interpregnancy interval, declines tdap. Saw Cardiology for tachycardia (no intervention).     Prenatal labs:  O positive, antibody screen negative,  Rubella  Immune, Hep B/HIV/RPR all negative, GC/CT negative, ,  GBS negative; genetic screening tests declined    Ultrasound at 17 weeks agreed with LMP EDC  (one week discrepancy)    20 week comprehensive ultrasound normal, posterior placenta, normal anatomy    OB history:   Obstetric History       T1      L1     SAB0   TAB0   Ectopic0   Multiple0   Live Births1       # Outcome Date GA Lbr Srinivasan/2nd Weight Sex Delivery Anes PTL Lv   2 Current            1 Term 17 38w4d 06:15 / 01:27 3.77 kg (8 lb 5 oz) M Vag-Spont EPI N GRISELDA      Name: SUSANNA,BABYRayshawn GALO      Apgar1:  9                Apgar5: 9            PMHx:     Past Medical History:   Diagnosis Date     Palpitations      S/P ablation of ventricular arrhythmia 2017     SVT (supraventricular tachycardia) (H)        PSHX:    Past Surgical History:   Procedure Laterality Date     DRAIN SKIN ABSCESS SIMPLE      left thumb     STRESS ECHO ([facility])  2010    Normal       Meds:    No current outpatient medications on file.       Allergies: Patient has no known allergies.      REVIEW OF SYSTEMS:  Positives and negatives in HPI.     SocHx:    Social History     Socioeconomic History     Marital status:      Spouse name: Not on file     Number of children: Not on file     Years of education: Not on file     Highest education level: Not on file   Social Needs     Financial resource strain: Not on file     Food insecurity - worry: Not  on file     Food insecurity - inability: Not on file     Transportation needs - medical: Not on file     Transportation needs - non-medical: Not on file   Occupational History     Occupation: Student   Tobacco Use     Smoking status: Never Smoker     Smokeless tobacco: Never Used   Substance and Sexual Activity     Alcohol use: No     Drug use: No     Sexual activity: No   Other Topics Concern     Parent/sibling w/ CABG, MI or angioplasty before 65F 55M? Not Asked      Service Not Asked     Blood Transfusions Not Asked     Caffeine Concern Yes     Comment: tea or coffee 2-3 per day     Occupational Exposure Not Asked     Hobby Hazards Not Asked     Sleep Concern Not Asked     Stress Concern Not Asked     Weight Concern Not Asked     Special Diet No     Back Care Not Asked     Exercise No     Comment: push ups and sit ups     Bike Helmet Not Asked     Comment: NA     Seat Belt Not Asked     Self-Exams Not Asked   Social History Narrative    ** Merged History Encounter **         Eats fruits and vegetables every day. Calcium and vitamin D supplements recommended.        Fam Hx:    Family History   Problem Relation Age of Onset     Coronary Artery Disease No family hx of      Asthma No family hx of      C.A.D. No family hx of      Diabetes Father      Hypertension Mother      Breast Cancer No family hx of      Cancer - colorectal No family hx of      Anesthesia Reaction No family hx of      Blood Disease No family hx of      Eye Disorder No family hx of      Osteoporosis No family hx of      Thyroid Disease No family hx of      Diabetes Mother          PHYSICAL EXAM:      Vitals:  /78   Temp 97.5  F (36.4  C) (Oral)   Resp 16   Breastfeeding? No   Alert Awake in NAD  ABD gravid, non-tender, EFW 8#  Cervix:  4 cm / 80 % effaced at -2 station, membranes are intact  EFM:  Baseline 135, with moderate3 variability, accels are present, no decels  [place]: contractions q2-4 min    Assessment:  IUP at 39w0d  admitted for evaluation of spontaneous labor, GBS negative. Satisfactory fetal status.      Plan:  Admission            Continuous fetal and uterine monitoring            Analgesia.  The patient would like to defer any analgesics for now, will consider IV medication or epidural as labor progresses.              The plan of care was discussed with the patient and her partner.  They expressed understanding and agreement.             Anticipate        Shoaib Dasilva MD  Dept of OB/GYN  2019

## 2019-01-30 NOTE — PROVIDER NOTIFICATION
01/30/19 0950   Provider Notification   Provider Name/Title Dr. Dasilva   Method of Notification In Department   Request Evaluate - Remote   Notification Reason Patient Arrived;Labor Status;Uterine Activity;Pain;Status Update;RAJESH STEWART in department, updated MD that patient arrived to [facility] from home, states she has been rocco since 2 am, and every 10 min since 5 am. Patient was dilated to 1 in the clinic last week, here in labor and delivery she is dilated to 4/85/-2 with a bulgy bag. Patient is not uncomfortable yet, but planning on a natural birth.   Patient is GBS negative. Orders received from Dr. Dasilva to admit to a room for labor., will come see patient later to rupture membranes.

## 2019-01-30 NOTE — PROVIDER NOTIFICATION
01/30/19 1350   Provider Notification   Provider Name/Title Dr. Dasilva    Method of Notification At Bedside   MD at bedside for delivery

## 2019-01-31 LAB
HGB BLD-MCNC: 11.9 G/DL (ref 11.7–15.7)
T PALLIDUM AB SER QL: NONREACTIVE

## 2019-01-31 PROCEDURE — 25000132 ZZH RX MED GY IP 250 OP 250 PS 637: Performed by: OBSTETRICS & GYNECOLOGY

## 2019-01-31 PROCEDURE — 85018 HEMOGLOBIN: CPT | Performed by: OBSTETRICS & GYNECOLOGY

## 2019-01-31 PROCEDURE — 12000000 ZZH R&B MED SURG/OB

## 2019-01-31 PROCEDURE — 36415 COLL VENOUS BLD VENIPUNCTURE: CPT | Performed by: OBSTETRICS & GYNECOLOGY

## 2019-01-31 RX ADMIN — SENNOSIDES AND DOCUSATE SODIUM 1 TABLET: 8.6; 5 TABLET ORAL at 08:05

## 2019-01-31 NOTE — PROGRESS NOTES
OB Post-partum Note  PPD# 1    S:  Patient doing well.  Pain controlled.  Voiding.  Bleeding is normal.  Breast feeding. Pt seen using  phone.    O:  /78   Pulse 68   Temp 98.5  F (36.9  C) (Oral)   Resp 16   Breastfeeding? Unknown   Gen- A&O, NAD  Abd- Non-tender, fundus non tender and firm   Ext- non-tender, no calf pain    Hemoglobin   Date Value Ref Range Status   2019 11.9 11.7 - 15.7 g/dL Final     O +  Rubella Immune    A/P: 28 year old  PPD# 1 s/p     1.  Routine post-partum cares  2.  Discharge PPD#2      Luda Green  2019  8:35 AM

## 2019-01-31 NOTE — L&D DELIVERY NOTE
Delivery Date:  2019      ANTEPARTUM DIAGNOSES:  Intrauterine pregnancy, 39 weeks' gestation, spontaneous active labor.      POSTPARTUM DIAGNOSES:  Status post amniotomy augmented normal spontaneous vaginal delivery, infant male, 8 pounds 8 ounces, Apgars 8 and 9, no episiotomy or repair, spontaneous placental passage intact.      PATIENT IDENTIFICATION:  Linda Serrano is a 28-year-old  woman,  2, para 2, who was admitted to [facility] for evaluation of spontaneous uterine activity.  The patient was followed at our office since 17 weeks' gestation.  Her prenatal care was delayed due to insurance issues.  The pregnancy was complicated by a short interpregnancy interval.  The patient saw Cardiology during the pregnancy for tachycardia and no intervention was necessary.  Her blood type was O positive, antibody screen negative, rubella titer showed immunity.  Hepatitis B, HIV, RPR were all negative.  A chlamydia and gonorrhea DNA probe were negative.  One-hour glucose screen was normal.  GBS culture was negative at term.  Genetic screening tests were declined.  An ultrasound was performed at 17 weeks' gestation which agreed with the last menstrual period due date.  There was discrepancy by 1 week.  A 20-week comprehensive ultrasound was normal showing a posterior placenta and normal fetal anatomy.  The patient progressed throughout the remainder of her pregnancy with no other complications identified.  Her tachycardic episodes were evaluated by Cardiology and no intervention was necessary.  The patient developed the onset of spontaneous uterine activity on the morning of 2019.  She presented to Labor and Delivery at [facility] for assessment.      HOSPITAL COURSE:     FIRST STAGE OF LABOR:  Linda was initially evaluated in the maternal assessment area at [facility].  She was rocco on occasion.  Fetal heart tones were normal with a  baseline of 135, moderate variability, accelerations were present.  Cervical exam disclosed the cervix to be 4 cm, 80%, vertex -2.  The patient was admitted.  Her group B strep culture was noted to be negative.  She continued to contract and was reexamined at 1208 on 2019.  The cervix was 7 cm, 90%, vertex 0 station.  Amniotomy was performed by myself and the fluid was clear.  The patient then continued to contract and she was complete by 1348.  The patient did not receive any analgesics during labor.  She was feeling quite uncomfortable with pelvic pressure and we began pushing immediately.      SECOND STAGE OF LABOR:  Maternal expulsive efforts were begun at 1350 on 2019.  The patient pushed very effectively.  The vertex descended in the pelvis and the baby was delivered at 1355.  The presentation was right occiput anterior.  The nose and mouth of the baby were bulb suctioned.  There was no nuchal cord present.  The remainder of the baby was delivered without shoulder dystocia.  The baby was placed on the mother's abdomen.  The product of the pregnancy was an infant male, 8 pounds 8 ounces with Apgars of 8 and 9 at 1 and 5 minutes respectively.  No  resuscitation was necessary.  There was no birth trauma, no congenital anomalies.      THIRD STAGE OF LABOR:  After a 3-minute third stage of labor, the placenta delivered spontaneously intact with a 3-vessel cord.  Examination of the perineum found no lacerations.  There was a very superficial skid farhana at the introitus at the 4 o'clock position, and this did not require any suturing.  Uterine tone was excellent after infusing intravenous Pitocin.  The patient remained in excellent condition following delivery, as did the baby.  There were no resuscitative efforts necessary for the baby.      DELIVERY SUMMARY:     1.  Pregnancy 39 weeks' gestation.   2.  Spontaneous active labor.   3.  Group B strep negative.   4.  Status post amniotomy augmented  normal spontaneous vaginal delivery, infant male, 8 pounds 8 ounces, Apgars 8 and 9.   5.  No episiotomy or repair necessary.   6.  Spontaneous placental passage intact.   7.  400 mL blood loss.         SHOAIB DASILVA MD             D: 2019   T: 2019   MT: IDA      Name:     IRAJ MULLINS   MRN:      [id number]        Account:        [id number]   :      1990        Delivery Date:  2019               Document: [id number]       cc: Shoaib Dasilva MD

## 2019-02-01 VITALS
SYSTOLIC BLOOD PRESSURE: 119 MMHG | RESPIRATION RATE: 16 BRPM | HEART RATE: 68 BPM | DIASTOLIC BLOOD PRESSURE: 75 MMHG | TEMPERATURE: 97.7 F

## 2019-02-01 PROCEDURE — 25000132 ZZH RX MED GY IP 250 OP 250 PS 637: Performed by: OBSTETRICS & GYNECOLOGY

## 2019-02-01 RX ADMIN — SENNOSIDES AND DOCUSATE SODIUM 1 TABLET: 8.6; 5 TABLET ORAL at 08:25

## 2019-02-01 NOTE — PROGRESS NOTES
Post-partum Note    Name:  Linda Serrano  MRN: [id number]    S: Patient is doing well.  Pain is minimal.  Tolerating regular diet without nausea or vomiting.  Ambulating without dizziness. Urinating without difficulty.  Lochia normal.  Breast feeding.  Plans discharge today.    Patient seen with Guyanese interpretor.    O:   Patient Vitals for the past 24 hrs:   BP Temp Temp src Pulse Resp   19 0105 113/73 98.2  F (36.8  C) Oral 64 16   19 1600 114/84 98  F (36.7  C) Oral 69 16   19 0756 113/78 98.5  F (36.9  C) Oral 68 16     Gen:  Resting comfortably, NAD  Pulm:  Breathing comfortably on room air  Abd:  Soft, appropriately ttp, non-distended.Fundus at 2cm below umbilicus, firm and non-tender.  Ext:  non-tender, no bilateral LE edema    Hgb:   Hemoglobin   Date Value Ref Range Status   2019 11.9 11.7 - 15.7 g/dL Final       Assessment/Plan:  28 year old  on PPD #2 s/p .  1. Continue with routine postpartum management  2. Rh: Positive  5. Feed: Breastfeeding  Dispo: DC home today. Follow-up in 6 weeks, sooner for any concerns.    MD Nabeel [facility]  2019, 7:43 AM

## 2019-02-01 NOTE — DISCHARGE INSTRUCTIONS
Lactation: [phone]  [facility] Care: [phone]  Vaginal Delivery Discharge Instructions: [place] Islander  Ôèçè÷åñêàÿ íàãðóçêà:     Åñëè âàì íåîáõîäèìà ïîìîùü, ïîïðîñèòå î íåé ñâîèõ ðîäñòâåííèêîâ è äðóçåé.    Íå ââîäèòå âî âëàãàëèùå íè÷åãî, ïîêà íå ïîëó÷èòå íà ýòî ðàçðåøåíèå âðà÷à.    Ïîñòàðàéòåñü îòêàçàòüñÿ îò ôèçè÷åñêîé íàãðóçêè â òå÷åíèå íåñêîëüêèõ íåäåëü, ÷òîáû ïîçâîëèòü îðãàíèçìó âîññòàíîâèòüñÿ. Âû ìîæåòå çàíèìàòüñÿ ëþáîé äåÿòåëüíîñòüþ, íî íå ïåðåóñåðäñòâóéòå.    Íå ñàäèòåñü çà ðóëü àâòîìîáèëÿ, åñëè âû ïðèíèìàåòå áîëåóòîëÿþùèå ïðåïàðàòû, âûïèñàííûå âðà÷îì. Âîæäåíèå àâòîìîáèëÿ ðàçðåøàåòñÿ, åñëè áîëåóòîëÿþùèå ëåêàðñòâà, êîòîðûå âû ïðèíèìàåòå, ìîæíî êóïèòü áåç ðåöåïòà.    Ïîçâîíèòå âàøåìó ïîñòàâùèêó ìåäèöèíñêèõ óñëóã, åñëè ó âàñ íàáëþäàåòñÿ ëþáîé èç ñëåäóþùèõ ñèìïòîìîâ:    Ãèãèåíè÷åñêàÿ ïðîêëàäêà íàñêâîçü ïðîìîêëà îò êðîâè â òå÷åíèå îäíîãî ÷àñà èëè âû çàìåòèëè ñãóñòêè êðîâè ðàçìåðîì áîëüøå, ÷åì ìÿ÷ äëÿ èãðû â ãîëüô.    Êðîâîòå÷åíèå íå ïðåêðàùàåòñÿ â òå÷åíèå áîëåå ÷åì 6 íåäåëü.    Ó âàñ èìåþòñÿ âëàãàëèùíûå âûäåëåíèÿ ñ íåïðèÿòíûì çàïàõîì.     Òåìïåðàòóðà 100,4  F (38  C) èëè âûøå (ïðè èçìåðåíèè òåìïåðàòóðû ïîä ÿçûêîì), ñîïðîâîæäàþùàÿñÿ îçíîáîì èëè áåç íåãî.     Ñèëüíàÿ áîëü, ñõâàòêîîáðàçíàÿ áîëü èëè áîëåçíåííûå îùóùåíèÿ âíèçó æèâîòà.    Óñèëåíèå áîëè, îòå÷íîñòü, ïîêðàñíåíèå èëè ñêîïëåíèå æèäêîñòè â ìåñòå íàëîæåíèÿ øâîâ.    Æåëàíèå ìî÷èòüñÿ áîëåå ÷àñòî è íåñïîñîáíîñòü äîëãî òåðïåòü èëè îùóùåíèå ææåíèÿ âî âðåìÿ ìî÷åèñïóñêàíèÿ.    Ïîêðàñíåíèå, îòåê èëè áîëü â îáëàñòè âåíû íà íîãå.    Ïðîáëåìû ñ êîðìëåíèåì ãðóäüþ èëè íàëè÷èå ïîêðàñíåíèÿ èëè áîëåçíåííîãî ìåñòà íà ìîëî÷íîé æåëåçå.    Áîëü, âîçíèêøàÿ â ðåçóëüòàòå ýïèçèîòîìèè èëè ðàçðûâà ïðîìåæíîñòè, êîòîðàÿ óñèëèâàåòñÿ èëè íå ïðîõîäèò.    Òîøíîòà è ðâîòà.    Áîëü â ãðóäè è êàøåëü, èëè âàì òðóäíî äûøàòü.    Íåñïîñîáíîñòü ïðåîäîëåòü ãðóñòü, òðåâîãó èëè äåïðåññèþ.   Åñëè ó âàñ âîçíèêëè îïàñåíèÿ, ÷òî âû ìîæåòå ïðè÷èíèòü âðåä ñåáå èëè ðåáåíêó, íåçàìåäëèòåëüíî ïîçâîíèòå âðà÷ó.      Ó âàñ ïîÿâèëèñü âîïðîñû ïîñëå òîãî, êàê âû âåðíóëèñü äîìîé èç áîëüíèöû.    ×àñòî ìîéòå ðóêè:  Ïåðåä òåì êàê äîòðàãèâàòüñÿ äî ñâîåé ïðîìåæíîñòè èëè øâîâ, âñåãäà ìîéòå ðóêè.  Ýòî ïîçâîëÿåò ñîêðàòèòü ðèñê ðàçâèòèÿ èíôåêöèè.  Åñëè ðóêè ó âàñ íå ãðÿçíûå, äëÿ ñîáëþäåíèÿ íàäëåæàùåé ãèãèåíû âû ìîæåòå ïðîòåðåòü èõ ñ ïîìîùüþ äåçèíôèöèðóþùåãî ñîñòàâà äëÿ ðóê íà ñïèðòîâîé îñíîâå.  Êîðîòêî ïîäñòðèãàéòå íîãòè è óõàæèâàéòå çà íèìè.      Vaginal Delivery Discharge Instructions  Activity:     Ask family and friends for help when you need it.    Do not place anything in your vagina until your doctor approves.    Take it easy for the next few weeks to allow your body to recover. You may do any activities you feel up to at that point.    Do not drive while taking pain pills prescribed by your doctor. You may drive if taking over-the-counter pain pills.    Call your health care provider if you have any of these symptoms:    You soak a sanitary pad with blood within 1 hour, or you see blood clots larger than a golf ball.    Bleeding that lasts more than 6 weeks.    You have vaginal discharge that smells bad.     A fever of 100.4  F (38  C) or higher (temperature taken under your tongue), with or without chills     Severe, pain, cramping or tenderness in your lower belly area.    Increased pain, swelling, redness or fluid around your stitches.    A more frequent or urgent need to urinate (pee), or it burns when you pee.    Redness, swelling or pain around a vein in your leg.    Problems breastfeeding, or a red or painful area on your breast.    Pain that increases or does not go away from an episiotomy or perineal tear.    Nausea and vomiting.    Chest pain and cough or are gasping for air.    Problems coping with sadness, anxiety, or depression.     If you have any concerns about hurting yourself or the baby, call your doctor right away.     You have questions or concerns after you return home.    Keep your hands clean:  Always wash  your hands before touching your perineal area and stitches.  This helps reduce your risk of infection.  If your hands aren t dirty, you may use an alcohol hand-rub to clean your hands. Keep your nails clean and short.

## 2019-02-08 ENCOUNTER — OFFICE VISIT (OUTPATIENT)
Dept: CARDIOLOGY | Facility: CLINIC | Age: 29
End: 2019-02-08
Attending: INTERNAL MEDICINE
Payer: COMMERCIAL

## 2019-02-08 VITALS
BODY MASS INDEX: 26.84 KG/M2 | DIASTOLIC BLOOD PRESSURE: 82 MMHG | WEIGHT: 167 LBS | SYSTOLIC BLOOD PRESSURE: 115 MMHG | HEIGHT: 66 IN | HEART RATE: 63 BPM

## 2019-02-08 DIAGNOSIS — R00.2 PALPITATIONS: ICD-10-CM

## 2019-02-08 PROCEDURE — 99213 OFFICE O/P EST LOW 20 MIN: CPT | Performed by: PHYSICIAN ASSISTANT

## 2019-02-08 ASSESSMENT — MIFFLIN-ST. JEOR: SCORE: 1504.26

## 2019-02-08 NOTE — PROGRESS NOTES
"HPI:   I had the pleasure of seeing Linda when she came for follow up of recent Holter monitor results. She's a 28 year old [place] Islander speaking woman who recently saw Dr. Velazquez for her history of:    1.  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  Beta-blocker caused significant fatigue and event monitor showed SVT up to 223 bpm which was symptomatic.  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  Recurrent palpitations and tachycardia while pregnant, 48-hour Holter monitor ordered showing sinus rhythm/sinus tachycardia and no recurrent atrial arrhythmias  3.  Structurally normal heart based on echocardiogram 1/2017    When Linda saw Dr. Velazquez 1/25, she complained of rapid heart rates, noting that her heart rate had been about 120 bpm in the evening.  EKG showed sinus rhythm.  She did not think it was similar to her previous palpitations with SVT.  Dr. Velazquez recommended a 48-hour Holter monitor.  He noted that she was 38 weeks pregnant    Subsequently, she gave birth to a baby boy (Titus) 1/30/19.  Since then, she has done well.  She states that even when she wore the monitor she only activated at once and it was not for high heart rates, but instead due to \"skipped beats.\"    She has not had any fainting or falling.  She is done well since her delivery, with no problems with edema, orthopnea, PND, rapid weight gain or chest discomfort.  She has not had any shortness of breath.    48-hour Holter monitor worn 1/25-1/27 shows sinus rhythm with an average rate of 78 bpm.  Minimum was 48 at about 4 in the morning and maximum was 140 bpm at about 3:15 in the afternoon.  She did activate the monitor twice, but states that only once with her symptoms and the other was was a result of her son accidentally pressing it.  When she activated she has sinus rhythm with a PAC.    Echocardiogram 1/2017 showed an EF of 60-65% with normal RV function.  No " significant valvular abnormalities were seen.    Assessment & Plan:    1.  Palpitations/tachycardia    Symptoms resolved even prior to her wearing the 48-hour Holter monitor and did not recur while wearing the monitor    She has had no problems post pregnancy, and denies any problems with lightheadedness, dizziness or any syncopal episodes associated    Holter monitor results as above    PLAN:    She was relieved to see the good results of her monitor    No formal follow-up has been made, but she will contact us if she has any cardiac issues or concerns        Kate Dowell PA-C, MSPAS      No orders of the defined types were placed in this encounter.    No orders of the defined types were placed in this encounter.    There are no discontinued medications.      Encounter Diagnosis   Name Primary?     Palpitations        CURRENT MEDICATIONS:  Current Outpatient Medications   Medication Sig Dispense Refill     Prenatal Multivit-Min-Fe-FA (PRE- FORMULA PO) Take 1 tablet by mouth daily         ALLERGIES   No Known Allergies    PAST MEDICAL HISTORY:  Past Medical History:   Diagnosis Date     Palpitations      S/P ablation of ventricular arrhythmia 2017     SVT (supraventricular tachycardia) (H)        PAST SURGICAL HISTORY:  Past Surgical History:   Procedure Laterality Date     DRAIN SKIN ABSCESS SIMPLE      left thumb     STRESS ECHO ([facility])  2010    Normal       FAMILY HISTORY:  Family History   Problem Relation Age of Onset     Coronary Artery Disease No family hx of      Asthma No family hx of      C.A.D. No family hx of      Diabetes Father      Hypertension Mother      Breast Cancer No family hx of      Cancer - colorectal No family hx of      Anesthesia Reaction No family hx of      Blood Disease No family hx of      Eye Disorder No family hx of      Osteoporosis No family hx of      Thyroid Disease No family hx of      Diabetes Mother        SOCIAL HISTORY:  Social History     Socioeconomic History  "    Marital status:      Spouse name: None     Number of children: None     Years of education: None     Highest education level: None   Social Needs     Financial resource strain: None     Food insecurity - worry: None     Food insecurity - inability: None     Transportation needs - medical: None     Transportation needs - non-medical: None   Occupational History     Occupation: Student   Tobacco Use     Smoking status: Never Smoker     Smokeless tobacco: Never Used   Substance and Sexual Activity     Alcohol use: No     Drug use: No     Sexual activity: No   Other Topics Concern     Parent/sibling w/ CABG, MI or angioplasty before 65F 55M? Not Asked      Service Not Asked     Blood Transfusions Not Asked     Caffeine Concern Yes     Comment: tea or coffee 2-3 per day     Occupational Exposure Not Asked     Hobby Hazards Not Asked     Sleep Concern Not Asked     Stress Concern Not Asked     Weight Concern Not Asked     Special Diet No     Back Care Not Asked     Exercise No     Comment: push ups and sit ups     Bike Helmet Not Asked     Comment: NA     Seat Belt Not Asked     Self-Exams Not Asked   Social History Narrative    ** Merged History Encounter **         Eats fruits and vegetables every day. Calcium and vitamin D supplements recommended.       Review of Systems:  Skin:  Negative     Eyes:  Negative    ENT:  Negative    Respiratory:  Negative for shortness of breath;dyspnea on exertion  Cardiovascular:  Negative for;palpitations;edema;syncope or near-syncope;dizziness;lightheadedness;fatigue    Gastroenterology: Negative heartburn  Genitourinary:  Negative    Musculoskeletal:  Negative nocturnal cramping  Neurologic:  Negative    Psychiatric:  Negative    Heme/Lymph/Imm:  Negative    Endocrine:  Negative      Physical Exam:  Vitals: /82   Pulse 63   Ht 1.676 m (5' 6\")   Wt 75.8 kg (167 lb)   BMI 26.95 kg/m      Constitutional:  cooperative;in no acute distress        Skin:  not " assessed this visit        Head:  not assessed this visit        Eyes:  not assessed this visit        ENT:  not assessed this visit        Neck:  not assessed this visit        Chest:  normal breath sounds, clear to auscultation, normal A-P diameter, normal symmetry, normal respiratory excursion, no use of accessory muscles        Cardiac: regular rhythm;no murmurs, gallops or rubs detected                  Abdomen:  abdomen soft        Vascular: not assessed this visit                                      Extremities and Back:  no deformities, clubbing, cyanosis, erythema observed;no edema        Neurological:  affect appropriate          Recent Lab Results:  LIPID RESULTS:  Lab Results   Component Value Date    CHOL 179 01/25/2011       LIVER ENZYME RESULTS:  No results found for: AST, ALT    CBC RESULTS:  Lab Results   Component Value Date    WBC 5.1 09/06/2017    RBC 4.62 09/06/2017    HGB 11.9 01/31/2019    HCT 39.6 09/06/2017    MCV 86 09/06/2017    MCH 27.9 09/06/2017    MCHC 32.6 09/06/2017    RDW 15.6 (H) 09/06/2017     09/06/2017       BMP RESULTS:  Lab Results   Component Value Date     09/06/2017    POTASSIUM 4.0 09/06/2017    CHLORIDE 107 09/06/2017    CO2 27 09/06/2017    ANIONGAP 5 09/06/2017    GLC 84 09/06/2017    BUN 9 09/06/2017    CR 0.79 09/06/2017    GFRESTIMATED 87 09/06/2017    GFRESTBLACK >90 09/06/2017    TIFFANI 9.3 09/06/2017

## 2019-02-08 NOTE — LETTER
"2/8/2019    JAMIE MEJIA MD  [address]  [facility] [address]    RE: Linda Serrano       Dear Colleague,    I had the pleasure of seeing Linda Montemayorambrosekimberli in the [facility].    HPI:   I had the pleasure of seeing Linda when she came for follow up of recent Holter monitor results. She's a 28 year old New Zealander speaking woman who recently saw Dr. Velazquez for her history of:    1.  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  Beta-blocker caused significant fatigue and event monitor showed SVT up to 223 bpm which was symptomatic.  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  Recurrent palpitations and tachycardia while pregnant, 48-hour Holter monitor ordered showing sinus rhythm/sinus tachycardia and no recurrent atrial arrhythmias  3.  Structurally normal heart based on echocardiogram 1/2017    When Linda saw Dr. Velazquez 1/25, she complained of rapid heart rates, noting that her heart rate had been about 120 bpm in the evening.  EKG showed sinus rhythm.  She did not think it was similar to her previous palpitations with SVT.  Dr. Velazquez recommended a 48-hour Holter monitor.  He noted that she was 38 weeks pregnant    Subsequently, she gave birth to a baby boy (Titus) 1/30/19.  Since then, she has done well.  She states that even when she wore the monitor she only activated at once and it was not for high heart rates, but instead due to \"skipped beats.\"    She has not had any fainting or falling.  She is done well since her delivery, with no problems with edema, orthopnea, PND, rapid weight gain or chest discomfort.  She has not had any shortness of breath.    48-hour Holter monitor worn 1/25-1/27 shows sinus rhythm with an average rate of 78 bpm.  Minimum was 48 at about 4 in the morning and maximum was 140 bpm at about 3:15 in the afternoon.  She did activate the monitor twice, but states " that only once with her symptoms and the other was was a result of her son accidentally pressing it.  When she activated she has sinus rhythm with a PAC.    Echocardiogram 2017 showed an EF of 60-65% with normal RV function.  No significant valvular abnormalities were seen.    Assessment & Plan:    1.  Palpitations/tachycardia    Symptoms resolved even prior to her wearing the 48-hour Holter monitor and did not recur while wearing the monitor    She has had no problems post pregnancy, and denies any problems with lightheadedness, dizziness or any syncopal episodes associated    Holter monitor results as above    PLAN:    She was relieved to see the good results of her monitor    No formal follow-up has been made, but she will contact us if she has any cardiac issues or concerns        Kate Dowell PA-C, MSPAS      No orders of the defined types were placed in this encounter.    No orders of the defined types were placed in this encounter.    There are no discontinued medications.      Encounter Diagnosis   Name Primary?     Palpitations        CURRENT MEDICATIONS:  Current Outpatient Medications   Medication Sig Dispense Refill     Prenatal Multivit-Min-Fe-FA (PRE- FORMULA PO) Take 1 tablet by mouth daily         ALLERGIES   No Known Allergies    PAST MEDICAL HISTORY:  Past Medical History:   Diagnosis Date     Palpitations      S/P ablation of ventricular arrhythmia 2017     SVT (supraventricular tachycardia) (H)        PAST SURGICAL HISTORY:  Past Surgical History:   Procedure Laterality Date     DRAIN SKIN ABSCESS SIMPLE      left thumb     STRESS ECHO ([facility])  2010    Normal       FAMILY HISTORY:  Family History   Problem Relation Age of Onset     Coronary Artery Disease No family hx of      Asthma No family hx of      C.A.D. No family hx of      Diabetes Father      Hypertension Mother      Breast Cancer No family hx of      Cancer - colorectal No family hx of      Anesthesia Reaction No family hx  of      Blood Disease No family hx of      Eye Disorder No family hx of      Osteoporosis No family hx of      Thyroid Disease No family hx of      Diabetes Mother        SOCIAL HISTORY:  Social History     Socioeconomic History     Marital status:      Spouse name: None     Number of children: None     Years of education: None     Highest education level: None   Social Needs     Financial resource strain: None     Food insecurity - worry: None     Food insecurity - inability: None     Transportation needs - medical: None     Transportation needs - non-medical: None   Occupational History     Occupation: Student   Tobacco Use     Smoking status: Never Smoker     Smokeless tobacco: Never Used   Substance and Sexual Activity     Alcohol use: No     Drug use: No     Sexual activity: No   Other Topics Concern     Parent/sibling w/ CABG, MI or angioplasty before 65F 55M? Not Asked      Service Not Asked     Blood Transfusions Not Asked     Caffeine Concern Yes     Comment: tea or coffee 2-3 per day     Occupational Exposure Not Asked     Hobby Hazards Not Asked     Sleep Concern Not Asked     Stress Concern Not Asked     Weight Concern Not Asked     Special Diet No     Back Care Not Asked     Exercise No     Comment: push ups and sit ups     Bike Helmet Not Asked     Comment: NA     Seat Belt Not Asked     Self-Exams Not Asked   Social History Narrative    ** Merged History Encounter **         Eats fruits and vegetables every day. Calcium and vitamin D supplements recommended.       Review of Systems:  Skin:  Negative     Eyes:  Negative    ENT:  Negative    Respiratory:  Negative for shortness of breath;dyspnea on exertion  Cardiovascular:  Negative for;palpitations;edema;syncope or near-syncope;dizziness;lightheadedness;fatigue    Gastroenterology: Negative heartburn  Genitourinary:  Negative    Musculoskeletal:  Negative nocturnal cramping  Neurologic:  Negative    Psychiatric:  Negative   "  Heme/Lymph/Imm:  Negative    Endocrine:  Negative      Physical Exam:  Vitals: /82   Pulse 63   Ht 1.676 m (5' 6\")   Wt 75.8 kg (167 lb)   BMI 26.95 kg/m       Constitutional:  cooperative;in no acute distress        Skin:  not assessed this visit        Head:  not assessed this visit        Eyes:  not assessed this visit        ENT:  not assessed this visit        Neck:  not assessed this visit        Chest:  normal breath sounds, clear to auscultation, normal A-P diameter, normal symmetry, normal respiratory excursion, no use of accessory muscles        Cardiac: regular rhythm;no murmurs, gallops or rubs detected                  Abdomen:  abdomen soft        Vascular: not assessed this visit                                      Extremities and Back:  no deformities, clubbing, cyanosis, erythema observed;no edema        Neurological:  affect appropriate          Recent Lab Results:  LIPID RESULTS:  Lab Results   Component Value Date    CHOL 179 01/25/2011       LIVER ENZYME RESULTS:  No results found for: AST, ALT    CBC RESULTS:  Lab Results   Component Value Date    WBC 5.1 09/06/2017    RBC 4.62 09/06/2017    HGB 11.9 01/31/2019    HCT 39.6 09/06/2017    MCV 86 09/06/2017    MCH 27.9 09/06/2017    MCHC 32.6 09/06/2017    RDW 15.6 (H) 09/06/2017     09/06/2017       BMP RESULTS:  Lab Results   Component Value Date     09/06/2017    POTASSIUM 4.0 09/06/2017    CHLORIDE 107 09/06/2017    CO2 27 09/06/2017    ANIONGAP 5 09/06/2017    GLC 84 09/06/2017    BUN 9 09/06/2017    CR 0.79 09/06/2017    GFRESTIMATED 87 09/06/2017    GFRESTBLACK >90 09/06/2017    TIFFANI 9.3 09/06/2017            Thank you for allowing me to participate in the care of your patient.    Sincerely,     Raeann Dowell PA-C     [facility]    "

## 2019-02-08 NOTE — LETTER
"2/8/2019    JAMIE MEJIA MD  [address]  [facility] [address]    RE: Linda Serrano       Dear Colleague,    I had the pleasure of seeing Linda Montemayorambrosekimberli in the [facility].    HPI:   I had the pleasure of seeing Linda when she came for follow up of recent Holter monitor results. She's a 28 year old [place]ayan speaking woman who recently saw Dr. Velazquez for her history of:    1.  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  Beta-blocker caused significant fatigue and event monitor showed SVT up to 223 bpm which was symptomatic.  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  Recurrent palpitations and tachycardia while pregnant, 48-hour Holter monitor ordered showing sinus rhythm/sinus tachycardia and no recurrent atrial arrhythmias  3.  Structurally normal heart based on echocardiogram 1/2017    When Linda saw Dr. Velazquez 1/25, she complained of rapid heart rates, noting that her heart rate had been about 120 bpm in the evening.  EKG showed sinus rhythm.  She did not think it was similar to her previous palpitations with SVT.  Dr. Velazquez recommended a 48-hour Holter monitor.  He noted that she was 38 weeks pregnant    Subsequently, she gave birth to a baby boy (Titus) 1/30/19.  Since then, she has done well.  She states that even when she wore the monitor she only activated at once and it was not for high heart rates, but instead due to \"skipped beats.\"    She has not had any fainting or falling.  She is done well since her delivery, with no problems with edema, orthopnea, PND, rapid weight gain or chest discomfort.  She has not had any shortness of breath.    48-hour Holter monitor worn 1/25-1/27 shows sinus rhythm with an average rate of 78 bpm.  Minimum was 48 at about 4 in the morning and maximum was 140 bpm at about 3:15 in the afternoon.  She did activate the monitor twice, but states " that only once with her symptoms and the other was was a result of her son accidentally pressing it.  When she activated she has sinus rhythm with a PAC.    Echocardiogram 2017 showed an EF of 60-65% with normal RV function.  No significant valvular abnormalities were seen.    Assessment & Plan:    1.  Palpitations/tachycardia    Symptoms resolved even prior to her wearing the 48-hour Holter monitor and did not recur while wearing the monitor    She has had no problems post pregnancy, and denies any problems with lightheadedness, dizziness or any syncopal episodes associated    Holter monitor results as above    PLAN:    She was relieved to see the good results of her monitor    No formal follow-up has been made, but she will contact us if she has any cardiac issues or concerns        Kate Dowell PA-C, MSPAS      No orders of the defined types were placed in this encounter.    No orders of the defined types were placed in this encounter.    There are no discontinued medications.      Encounter Diagnosis   Name Primary?     Palpitations        CURRENT MEDICATIONS:  Current Outpatient Medications   Medication Sig Dispense Refill     Prenatal Multivit-Min-Fe-FA (PRE- FORMULA PO) Take 1 tablet by mouth daily         ALLERGIES   No Known Allergies    PAST MEDICAL HISTORY:  Past Medical History:   Diagnosis Date     Palpitations      S/P ablation of ventricular arrhythmia 2017     SVT (supraventricular tachycardia) (H)        PAST SURGICAL HISTORY:  Past Surgical History:   Procedure Laterality Date     DRAIN SKIN ABSCESS SIMPLE      left thumb     STRESS ECHO ([facility])  2010    Normal       FAMILY HISTORY:  Family History   Problem Relation Age of Onset     Coronary Artery Disease No family hx of      Asthma No family hx of      C.A.D. No family hx of      Diabetes Father      Hypertension Mother      Breast Cancer No family hx of      Cancer - colorectal No family hx of      Anesthesia Reaction No family hx  of      Blood Disease No family hx of      Eye Disorder No family hx of      Osteoporosis No family hx of      Thyroid Disease No family hx of      Diabetes Mother        SOCIAL HISTORY:  Social History     Socioeconomic History     Marital status:      Spouse name: None     Number of children: None     Years of education: None     Highest education level: None   Social Needs     Financial resource strain: None     Food insecurity - worry: None     Food insecurity - inability: None     Transportation needs - medical: None     Transportation needs - non-medical: None   Occupational History     Occupation: Student   Tobacco Use     Smoking status: Never Smoker     Smokeless tobacco: Never Used   Substance and Sexual Activity     Alcohol use: No     Drug use: No     Sexual activity: No   Other Topics Concern     Parent/sibling w/ CABG, MI or angioplasty before 65F 55M? Not Asked      Service Not Asked     Blood Transfusions Not Asked     Caffeine Concern Yes     Comment: tea or coffee 2-3 per day     Occupational Exposure Not Asked     Hobby Hazards Not Asked     Sleep Concern Not Asked     Stress Concern Not Asked     Weight Concern Not Asked     Special Diet No     Back Care Not Asked     Exercise No     Comment: push ups and sit ups     Bike Helmet Not Asked     Comment: NA     Seat Belt Not Asked     Self-Exams Not Asked   Social History Narrative    ** Merged History Encounter **         Eats fruits and vegetables every day. Calcium and vitamin D supplements recommended.       Review of Systems:  Skin:  Negative     Eyes:  Negative    ENT:  Negative    Respiratory:  Negative for shortness of breath;dyspnea on exertion  Cardiovascular:  Negative for;palpitations;edema;syncope or near-syncope;dizziness;lightheadedness;fatigue    Gastroenterology: Negative heartburn  Genitourinary:  Negative    Musculoskeletal:  Negative nocturnal cramping  Neurologic:  Negative    Psychiatric:  Negative   "  Heme/Lymph/Imm:  Negative    Endocrine:  Negative      Physical Exam:  Vitals: /82   Pulse 63   Ht 1.676 m (5' 6\")   Wt 75.8 kg (167 lb)   BMI 26.95 kg/m       Constitutional:  cooperative;in no acute distress        Skin:  not assessed this visit        Head:  not assessed this visit        Eyes:  not assessed this visit        ENT:  not assessed this visit        Neck:  not assessed this visit        Chest:  normal breath sounds, clear to auscultation, normal A-P diameter, normal symmetry, normal respiratory excursion, no use of accessory muscles        Cardiac: regular rhythm;no murmurs, gallops or rubs detected                  Abdomen:  abdomen soft        Vascular: not assessed this visit                                      Extremities and Back:  no deformities, clubbing, cyanosis, erythema observed;no edema        Neurological:  affect appropriate          Recent Lab Results:  LIPID RESULTS:  Lab Results   Component Value Date    CHOL 179 01/25/2011       LIVER ENZYME RESULTS:  No results found for: AST, ALT    CBC RESULTS:  Lab Results   Component Value Date    WBC 5.1 09/06/2017    RBC 4.62 09/06/2017    HGB 11.9 01/31/2019    HCT 39.6 09/06/2017    MCV 86 09/06/2017    MCH 27.9 09/06/2017    MCHC 32.6 09/06/2017    RDW 15.6 (H) 09/06/2017     09/06/2017       BMP RESULTS:  Lab Results   Component Value Date     09/06/2017    POTASSIUM 4.0 09/06/2017    CHLORIDE 107 09/06/2017    CO2 27 09/06/2017    ANIONGAP 5 09/06/2017    GLC 84 09/06/2017    BUN 9 09/06/2017    CR 0.79 09/06/2017    GFRESTIMATED 87 09/06/2017    GFRESTBLACK >90 09/06/2017    TIFFANI 9.3 09/06/2017              Thank you for allowing me to participate in the care of your patient.      Sincerely,     TAHIRA RyanC     [facility]    cc:   Ayo Velazquez MD  [address]  [place] [id number]        "

## 2019-02-08 NOTE — PATIENT INSTRUCTIONS
1. Everything today looked good! Your monitor showed normal rhythm and occasional skipped/early beat (PAC - premature atrial contraction).    2. As symptoms have improved, no need to make changes or see us back. CALL if issues - we'd be happy to see you!  My nurses are Italia/Pat: [phone]

## 2019-11-21 LAB
HBV SURFACE AG SERPL QL IA: NORMAL
HIV 1+2 AB+HIV1 P24 AG SERPL QL IA: NORMAL
RUBELLA ANTIBODY IGG QUANTITATIVE: NORMAL IU/ML

## 2020-04-28 LAB — GROUP B STREP PCR: NEGATIVE

## 2020-05-09 ENCOUNTER — HOSPITAL ENCOUNTER (INPATIENT)
Facility: CLINIC | Age: 30
LOS: 2 days | Discharge: HOME | End: 2020-05-11
Attending: OBSTETRICS & GYNECOLOGY | Admitting: OBSTETRICS & GYNECOLOGY
Payer: COMMERCIAL

## 2020-05-09 DIAGNOSIS — Z30.09 CONSULTATION FOR STERILIZATION: ICD-10-CM

## 2020-05-09 PROBLEM — Z36.89 ENCOUNTER FOR TRIAGE IN PREGNANT PATIENT: Status: ACTIVE | Noted: 2017-06-25

## 2020-05-09 LAB
ABO + RH BLD: NORMAL
ABO + RH BLD: NORMAL
BASOPHILS # BLD AUTO: 0 10E9/L (ref 0–0.2)
BASOPHILS NFR BLD AUTO: 0.2 %
BLD GP AB SCN SERPL QL: NORMAL
BLOOD BANK CMNT PATIENT-IMP: NORMAL
DIFFERENTIAL METHOD BLD: ABNORMAL
EOSINOPHIL # BLD AUTO: 0.1 10E9/L (ref 0–0.7)
EOSINOPHIL NFR BLD AUTO: 0.6 %
ERYTHROCYTE [DISTWIDTH] IN BLOOD BY AUTOMATED COUNT: 13.5 % (ref 10–15)
HCT VFR BLD AUTO: 38.3 % (ref 35–47)
HGB BLD-MCNC: 11.9 G/DL (ref 11.7–15.7)
IMM GRANULOCYTES # BLD: 0.1 10E9/L (ref 0–0.4)
IMM GRANULOCYTES NFR BLD: 0.7 %
LYMPHOCYTES # BLD AUTO: 2 10E9/L (ref 0.8–5.3)
LYMPHOCYTES NFR BLD AUTO: 18.3 %
MCH RBC QN AUTO: 27.9 PG (ref 26.5–33)
MCHC RBC AUTO-ENTMCNC: 31.1 G/DL (ref 31.5–36.5)
MCV RBC AUTO: 90 FL (ref 78–100)
MONOCYTES # BLD AUTO: 0.9 10E9/L (ref 0–1.3)
MONOCYTES NFR BLD AUTO: 8.6 %
NEUTROPHILS # BLD AUTO: 7.6 10E9/L (ref 1.6–8.3)
NEUTROPHILS NFR BLD AUTO: 71.6 %
NRBC # BLD AUTO: 0 10*3/UL
NRBC BLD AUTO-RTO: 0 /100
PLATELET # BLD AUTO: 118 10E9/L (ref 150–450)
RBC # BLD AUTO: 4.27 10E12/L (ref 3.8–5.2)
RUPTURE OF FETAL MEMBRANES BY ROM PLUS: POSITIVE
SARS-COV-2 PCR COMMENT: NORMAL
SARS-COV-2 RNA SPEC QL NAA+PROBE: NEGATIVE
SARS-COV-2 RNA SPEC QL NAA+PROBE: NORMAL
SPECIMEN EXP DATE BLD: NORMAL
SPECIMEN SOURCE: NORMAL
SPECIMEN SOURCE: NORMAL
T PALLIDUM AB SER QL: NONREACTIVE
WBC # BLD AUTO: 10.6 10E9/L (ref 4–11)

## 2020-05-09 PROCEDURE — 12000000 ZZH R&B MED SURG/OB

## 2020-05-09 PROCEDURE — G0463 HOSPITAL OUTPT CLINIC VISIT: HCPCS

## 2020-05-09 PROCEDURE — 86850 RBC ANTIBODY SCREEN: CPT | Performed by: OBSTETRICS & GYNECOLOGY

## 2020-05-09 PROCEDURE — 86901 BLOOD TYPING SEROLOGIC RH(D): CPT | Performed by: OBSTETRICS & GYNECOLOGY

## 2020-05-09 PROCEDURE — 25000125 ZZHC RX 250: Performed by: OBSTETRICS & GYNECOLOGY

## 2020-05-09 PROCEDURE — 25800030 ZZH RX IP 258 OP 636: Performed by: OBSTETRICS & GYNECOLOGY

## 2020-05-09 PROCEDURE — 86900 BLOOD TYPING SEROLOGIC ABO: CPT | Performed by: OBSTETRICS & GYNECOLOGY

## 2020-05-09 PROCEDURE — 3E033VJ INTRODUCTION OF OTHER HORMONE INTO PERIPHERAL VEIN, PERCUTANEOUS APPROACH: ICD-10-PCS | Performed by: OBSTETRICS & GYNECOLOGY

## 2020-05-09 PROCEDURE — 85025 COMPLETE CBC W/AUTO DIFF WBC: CPT | Performed by: OBSTETRICS & GYNECOLOGY

## 2020-05-09 PROCEDURE — 72200001 ZZH LABOR CARE VAGINAL DELIVERY SINGLE

## 2020-05-09 PROCEDURE — 25000128 H RX IP 250 OP 636: Performed by: OBSTETRICS & GYNECOLOGY

## 2020-05-09 PROCEDURE — 84112 EVAL AMNIOTIC FLUID PROTEIN: CPT | Performed by: OBSTETRICS & GYNECOLOGY

## 2020-05-09 PROCEDURE — 86780 TREPONEMA PALLIDUM: CPT | Performed by: OBSTETRICS & GYNECOLOGY

## 2020-05-09 PROCEDURE — 87635 SARS-COV-2 COVID-19 AMP PRB: CPT | Performed by: OBSTETRICS & GYNECOLOGY

## 2020-05-09 PROCEDURE — 25000132 ZZH RX MED GY IP 250 OP 250 PS 637: Performed by: OBSTETRICS & GYNECOLOGY

## 2020-05-09 RX ORDER — NALOXONE HYDROCHLORIDE 0.4 MG/ML
.1-.4 INJECTION, SOLUTION INTRAMUSCULAR; INTRAVENOUS; SUBCUTANEOUS
Status: DISCONTINUED | OUTPATIENT
Start: 2020-05-09 | End: 2020-05-09

## 2020-05-09 RX ORDER — METHYLERGONOVINE MALEATE 0.2 MG/ML
200 INJECTION INTRAVENOUS
Status: DISCONTINUED | OUTPATIENT
Start: 2020-05-09 | End: 2020-05-09

## 2020-05-09 RX ORDER — IBUPROFEN 800 MG/1
800 TABLET, FILM COATED ORAL EVERY 6 HOURS PRN
Status: DISCONTINUED | OUTPATIENT
Start: 2020-05-09 | End: 2020-05-11 | Stop reason: HOSPADM

## 2020-05-09 RX ORDER — MODIFIED LANOLIN
OINTMENT (GRAM) TOPICAL
Status: DISCONTINUED | OUTPATIENT
Start: 2020-05-09 | End: 2020-05-11 | Stop reason: HOSPADM

## 2020-05-09 RX ORDER — ACETAMINOPHEN 325 MG/1
650 TABLET ORAL EVERY 4 HOURS PRN
Status: DISCONTINUED | OUTPATIENT
Start: 2020-05-09 | End: 2020-05-09

## 2020-05-09 RX ORDER — CEFAZOLIN SODIUM 1 G/3ML
1 INJECTION, POWDER, FOR SOLUTION INTRAMUSCULAR; INTRAVENOUS SEE ADMIN INSTRUCTIONS
Status: DISCONTINUED | OUTPATIENT
Start: 2020-05-09 | End: 2020-05-09

## 2020-05-09 RX ORDER — AMOXICILLIN 250 MG
1 CAPSULE ORAL 2 TIMES DAILY
Status: DISCONTINUED | OUTPATIENT
Start: 2020-05-09 | End: 2020-05-11 | Stop reason: HOSPADM

## 2020-05-09 RX ORDER — SODIUM CHLORIDE, SODIUM LACTATE, POTASSIUM CHLORIDE, CALCIUM CHLORIDE 600; 310; 30; 20 MG/100ML; MG/100ML; MG/100ML; MG/100ML
INJECTION, SOLUTION INTRAVENOUS CONTINUOUS
Status: DISCONTINUED | OUTPATIENT
Start: 2020-05-09 | End: 2020-05-09

## 2020-05-09 RX ORDER — TRANEXAMIC ACID 10 MG/ML
1 INJECTION, SOLUTION INTRAVENOUS EVERY 30 MIN PRN
Status: DISCONTINUED | OUTPATIENT
Start: 2020-05-09 | End: 2020-05-09

## 2020-05-09 RX ORDER — OXYTOCIN 10 [USP'U]/ML
10 INJECTION, SOLUTION INTRAMUSCULAR; INTRAVENOUS
Status: DISCONTINUED | OUTPATIENT
Start: 2020-05-09 | End: 2020-05-11 | Stop reason: HOSPADM

## 2020-05-09 RX ORDER — CARBOPROST TROMETHAMINE 250 UG/ML
250 INJECTION, SOLUTION INTRAMUSCULAR
Status: DISCONTINUED | OUTPATIENT
Start: 2020-05-09 | End: 2020-05-11 | Stop reason: HOSPADM

## 2020-05-09 RX ORDER — OXYTOCIN/0.9 % SODIUM CHLORIDE 30/500 ML
1-24 PLASTIC BAG, INJECTION (ML) INTRAVENOUS CONTINUOUS
Status: DISCONTINUED | OUTPATIENT
Start: 2020-05-09 | End: 2020-05-09

## 2020-05-09 RX ORDER — MISOPROSTOL 200 UG/1
800 TABLET ORAL
Status: DISCONTINUED | OUTPATIENT
Start: 2020-05-09 | End: 2020-05-11 | Stop reason: HOSPADM

## 2020-05-09 RX ORDER — IBUPROFEN 800 MG/1
800 TABLET, FILM COATED ORAL
Status: DISCONTINUED | OUTPATIENT
Start: 2020-05-09 | End: 2020-05-09

## 2020-05-09 RX ORDER — TRANEXAMIC ACID 10 MG/ML
1 INJECTION, SOLUTION INTRAVENOUS EVERY 30 MIN PRN
Status: DISCONTINUED | OUTPATIENT
Start: 2020-05-09 | End: 2020-05-11 | Stop reason: HOSPADM

## 2020-05-09 RX ORDER — OXYCODONE AND ACETAMINOPHEN 5; 325 MG/1; MG/1
1 TABLET ORAL
Status: DISCONTINUED | OUTPATIENT
Start: 2020-05-09 | End: 2020-05-09

## 2020-05-09 RX ORDER — NALOXONE HYDROCHLORIDE 0.4 MG/ML
.1-.4 INJECTION, SOLUTION INTRAMUSCULAR; INTRAVENOUS; SUBCUTANEOUS
Status: DISCONTINUED | OUTPATIENT
Start: 2020-05-09 | End: 2020-05-11 | Stop reason: HOSPADM

## 2020-05-09 RX ORDER — OXYTOCIN/0.9 % SODIUM CHLORIDE 30/500 ML
340 PLASTIC BAG, INJECTION (ML) INTRAVENOUS CONTINUOUS PRN
Status: DISCONTINUED | OUTPATIENT
Start: 2020-05-09 | End: 2020-05-11 | Stop reason: HOSPADM

## 2020-05-09 RX ORDER — OXYTOCIN/0.9 % SODIUM CHLORIDE 30/500 ML
100 PLASTIC BAG, INJECTION (ML) INTRAVENOUS CONTINUOUS
Status: DISCONTINUED | OUTPATIENT
Start: 2020-05-09 | End: 2020-05-11 | Stop reason: HOSPADM

## 2020-05-09 RX ORDER — AMOXICILLIN 250 MG
2 CAPSULE ORAL 2 TIMES DAILY
Status: DISCONTINUED | OUTPATIENT
Start: 2020-05-09 | End: 2020-05-11 | Stop reason: HOSPADM

## 2020-05-09 RX ORDER — OXYTOCIN/0.9 % SODIUM CHLORIDE 30/500 ML
100-340 PLASTIC BAG, INJECTION (ML) INTRAVENOUS CONTINUOUS PRN
Status: DISCONTINUED | OUTPATIENT
Start: 2020-05-09 | End: 2020-05-09

## 2020-05-09 RX ORDER — OXYTOCIN 10 [USP'U]/ML
10 INJECTION, SOLUTION INTRAMUSCULAR; INTRAVENOUS
Status: DISCONTINUED | OUTPATIENT
Start: 2020-05-09 | End: 2020-05-09

## 2020-05-09 RX ORDER — CEFAZOLIN SODIUM 2 G/100ML
2 INJECTION, SOLUTION INTRAVENOUS
Status: DISCONTINUED | OUTPATIENT
Start: 2020-05-09 | End: 2020-05-09

## 2020-05-09 RX ORDER — FENTANYL CITRATE 50 UG/ML
50-100 INJECTION, SOLUTION INTRAMUSCULAR; INTRAVENOUS
Status: DISCONTINUED | OUTPATIENT
Start: 2020-05-09 | End: 2020-05-09

## 2020-05-09 RX ORDER — METHYLERGONOVINE MALEATE 0.2 MG/ML
200 INJECTION INTRAVENOUS
Status: DISCONTINUED | OUTPATIENT
Start: 2020-05-09 | End: 2020-05-11 | Stop reason: HOSPADM

## 2020-05-09 RX ORDER — HYDROCORTISONE 2.5 %
CREAM (GRAM) TOPICAL 3 TIMES DAILY PRN
Status: DISCONTINUED | OUTPATIENT
Start: 2020-05-09 | End: 2020-05-11 | Stop reason: HOSPADM

## 2020-05-09 RX ORDER — CARBOPROST TROMETHAMINE 250 UG/ML
250 INJECTION, SOLUTION INTRAMUSCULAR
Status: DISCONTINUED | OUTPATIENT
Start: 2020-05-09 | End: 2020-05-09

## 2020-05-09 RX ORDER — ACETAMINOPHEN 325 MG/1
650 TABLET ORAL EVERY 4 HOURS PRN
Status: DISCONTINUED | OUTPATIENT
Start: 2020-05-09 | End: 2020-05-11 | Stop reason: HOSPADM

## 2020-05-09 RX ORDER — SODIUM CHLORIDE, SODIUM LACTATE, POTASSIUM CHLORIDE, CALCIUM CHLORIDE 600; 310; 30; 20 MG/100ML; MG/100ML; MG/100ML; MG/100ML
INJECTION, SOLUTION INTRAVENOUS CONTINUOUS
Status: DISCONTINUED | OUTPATIENT
Start: 2020-05-10 | End: 2020-05-11 | Stop reason: HOSPADM

## 2020-05-09 RX ORDER — BISACODYL 10 MG
10 SUPPOSITORY, RECTAL RECTAL DAILY PRN
Status: DISCONTINUED | OUTPATIENT
Start: 2020-05-11 | End: 2020-05-11 | Stop reason: HOSPADM

## 2020-05-09 RX ORDER — ONDANSETRON 2 MG/ML
4 INJECTION INTRAMUSCULAR; INTRAVENOUS EVERY 6 HOURS PRN
Status: DISCONTINUED | OUTPATIENT
Start: 2020-05-09 | End: 2020-05-09

## 2020-05-09 RX ADMIN — SENNOSIDES AND DOCUSATE SODIUM 1 TABLET: 8.6; 5 TABLET ORAL at 20:13

## 2020-05-09 RX ADMIN — OXYTOCIN-SODIUM CHLORIDE 0.9% IV SOLN 30 UNIT/500ML 2 MILLI-UNITS/MIN: 30-0.9/5 SOLUTION at 09:20

## 2020-05-09 RX ADMIN — SODIUM CHLORIDE, POTASSIUM CHLORIDE, SODIUM LACTATE AND CALCIUM CHLORIDE: 600; 310; 30; 20 INJECTION, SOLUTION INTRAVENOUS at 09:20

## 2020-05-09 RX ADMIN — FENTANYL CITRATE 100 MCG: 50 INJECTION, SOLUTION INTRAMUSCULAR; INTRAVENOUS at 12:01

## 2020-05-09 RX ADMIN — IBUPROFEN 800 MG: 800 TABLET ORAL at 16:09

## 2020-05-09 ASSESSMENT — MIFFLIN-ST. JEOR: SCORE: 1610.39

## 2020-05-09 NOTE — L&D DELIVERY NOTE
OB Vaginal Delivery Note      HPI:  Pt is a 29 year old  @ 38w1d who presented to L&D on 20 for PROM.      Prenatal Course:  1st visit at 13 weeks, regular care, TWG 30lb    Prenatal labs:  O+, tequila negative, RI,  Hep B/HIV/RPR all negative, GC/CT negative, , GBS negative    Pregnancy complications:    - History of SVT, s/p ablation  - Short interpregnancy interval (last delivery 19)  - Desires postpartum bilateral salpingectomy. Has signed federal tubal papers.    OB History:   History of  x2    Hospital Course:    First Stage:  Patient was admitted to L&D on 20 for PROM with meconium stained fluid. FHT were category I, reactive.  Abdomen was non-tender.  EFW was 7.5 lb. Labor was induced with pitocin with a maximum of 4mu/min.   Patient's labor progressed.  She received IV fentanyl for analgesia.  Patient reached complete cervical dilation at 1305 on 2020.    Second Stage:  Patient was allowed to begin pushing after reaching complete cervical dilation.  Good maternal expulsive efforts were noted.  Fetal heart tones remained reassuring during the second stage. She was able to bring the fetal vertex to a full crown.  The fetal vertex was then easily delivered, followed by the fetal shoulders without complications.  The remainder of the infant was then delivered.  After 60 seconds the cord was clamped and cut and the infant was placed on the maternal abdomen.  The infants weight was pending at the time of this note.  Apgars were 9 and 9 at one and five minutes.  Cord gases were not sent.      Third Stage:  The placenta then delivered shortly thereafter.  It was noted to be in tact with a three vessel cord.  The patient's perineum was inspected and was intact.  EBL for the procedure was 50 ml.      The patient tolerated the delivery well.  Sponge and needle counts were correct.  The patient and infant remained in the delivery suite following delivery in stable condition.    Yuli  KIT Martinez MD  5/9/2020

## 2020-05-09 NOTE — PLAN OF CARE
Patient is bilingual-speaks both English and Swiss.  offered. Patient states she does not need an  right now but if she feels like she does need one she knows she can ask for one at anytime.

## 2020-05-09 NOTE — PROVIDER NOTIFICATION
05/09/20 0829   Provider Notification   Provider Name/Title Dr. Martinez   Method of Notification Phone   Request Evaluate - Remote   Notification Reason Patient Arrived;Uterine Activity;SVE;Lab/Diagnostic Study;Status Update   MD updated on patient arrival, history, SROM at 0600, ROM plus positive. SVE 3/70/-3, hathaway 7, meconium fluid seen with exam. Occasionally rocco and feeling some tightening, FHT's category one with accelerations. GBS negative. Intrapartum and pitocin augmentation orders received.

## 2020-05-09 NOTE — PROGRESS NOTES
SPIRITUAL HEALTH SERVICES Progress Note  [facility] Labor and Delivery 4th floor    SH consult per pt request.   Met with pt, Linda, and her spouse, Tony.   Linda and Tony share that they identify as Buddhist, connected to a [employer] in [place].  Linda is currently in the early stages of labor and they request prayer for their child.   Tony and Linda have two boys and this child is a boy as well.  We shared in prayer together. No other needs expressed at this time.   SH remains available.     LYNDSEY Vargas.  Staff    Pager #[phone]   Pronouns: he/him/his

## 2020-05-09 NOTE — PLAN OF CARE
Data: Linda Serrano transferred to [facility] via wheelchair at 1610. Baby transferred via parent's arms.  Action: Receiving unit notified of transfer: Yes. Patient and family notified of room change. Report given to RICHARD Blood at 1620. Belongings sent to receiving unit. Accompanied by Registered Nurse. Oriented patient to surroundings. Call light within reach. ID bands double-checked with receiving RN.  Response: Patient tolerated transfer and is stable.    Patients mobililty level scored using the bedside mobility assistance tool (BMAT). Patient is at a mobility level test number: 4. Mobility equipment used: wheelchair. Required assist of 0 staff members. Further use of BMAT scoring not required.

## 2020-05-09 NOTE — PLAN OF CARE
Data: [facility] presented to [facility]: 2020  7:42 AM.  Reason for maternal/fetal assessment is leaking vaginal fluid. Patient reports LOF around 0600 and then again a second time.  Patient is a .  Prenatal record reviewed. Pregnancy has been uncomplicated.  Gestational Age 38w1d. VSS. Fetal movement present. Patient denies uterine contractions, abdominal pain, pelvic pressure, nausea, vomiting, headache, visual disturbances, epigastric or URQ pain, significant edema. Support person Hero is present.   Action: Verbal consent for EFM. Triage assessment completed. Bill of rights reviewed. ROM plus sent and positive.   Response: Patient verbalized agreement with plan. Will contact Dr Dalia Pineda with update and further orders.

## 2020-05-09 NOTE — H&P
"OB Brief Admit H&P    No significant change in general health status based on examination of the patient, review of Nursing Admission Database and prenatal record.    Pt is a 29 year old  @ 38w1d who presented to L&D with PROM that occurred at 0600. Fluid is meconium stained. Reported non-painful abdominal tightenings. Denies fever, cough, SOB, CP. No known COVID-19 exposure.     Pitocin was started at 0915 and she is now becoming uncomfortable.    Patient's prenatal course has been complicated by:  - History of SVT, s/p ablation  - Short interpregnancy interval (last delivery 19)  - Desires postpartum bilateral salpingectomy. Has signed federal tubal papers.    Bilingual and declines Dutch interpretor.    She declined the flu vaccine and Tdap.    Prenatal Labs:    Blood type Rh positive  Rubella immune    GBS negative    EFW: 7.5 lb    /89   Temp 97.7  F (36.5  C) (Oral)   Resp 18   Ht 1.651 m (5' 5\")   Wt 88.5 kg (195 lb)   BMI 32.45 kg/m    General: uncomfortable with contractions  Abdomen: gravid, non-tender  SVE: 6-7/90/+    EFM:  125 baseline, moderate variability, +accels, occasional variable decels  [place]: q 2-2.5 minutes  Membranes:  PROM with meconium    Pitocin: 4mu/min    Assessment:  29 year old  @ 38w1d with PROM, on pitocin and making progress.     Plan:  1. Admit to labor and delivery   2. Continue pitocin per protocol.  3. Category II FHT, reassuring with moderate variability. Continue to monitor.   4. Pain: s/p fentanyl, declines epidural.   5. GBS negative.   6. Planning postpartum tubal. Assuming no contraindications, will plan for tomorrow. NPO at midnight with maintenance IVF. Discussed with Dr. Gipson, who will be on call tomorrow.     Anticipate .    Yuli Martinez MD  2020  12:54 PM    "

## 2020-05-09 NOTE — PROVIDER NOTIFICATION
05/09/20 1249   Provider Notification   Provider Name/Title Dr. Martinez   Method of Notification At Bedside   Patient visually more uncomfortable and states she is feeling more vaginal pressure. SVE per MD 6-7/90/+1. Preparing for delivery.

## 2020-05-09 NOTE — PROVIDER NOTIFICATION
05/09/20 1210   Provider Notification   Provider Name/Title Dr. Martinez   Method of Notification Phone   Notification Reason SVE;Status Update   MD updated: SVE 5/80/-2, patient starting to get more uncomfortable, dose of fentanyl given, not planning an epidural. Requested MD to come to hospital in anticipation of delivery.

## 2020-05-10 ENCOUNTER — ANESTHESIA (OUTPATIENT)
Dept: SURGERY | Facility: CLINIC | Age: 30
End: 2020-05-10
Payer: COMMERCIAL

## 2020-05-10 ENCOUNTER — ANESTHESIA EVENT (OUTPATIENT)
Dept: SURGERY | Facility: CLINIC | Age: 30
End: 2020-05-10
Payer: COMMERCIAL

## 2020-05-10 LAB
ERYTHROCYTE [DISTWIDTH] IN BLOOD BY AUTOMATED COUNT: 13.7 % (ref 10–15)
HCT VFR BLD AUTO: 36.9 % (ref 35–47)
HGB BLD-MCNC: 11.3 G/DL (ref 11.7–15.7)
MCH RBC QN AUTO: 28 PG (ref 26.5–33)
MCHC RBC AUTO-ENTMCNC: 30.6 G/DL (ref 31.5–36.5)
MCV RBC AUTO: 91 FL (ref 78–100)
PLATELET # BLD AUTO: 109 10E9/L (ref 150–450)
RBC # BLD AUTO: 4.04 10E12/L (ref 3.8–5.2)
WBC # BLD AUTO: 13.4 10E9/L (ref 4–11)

## 2020-05-10 PROCEDURE — 36000052 ZZH SURGERY LEVEL 2 EA 15 ADDTL MIN: Performed by: OBSTETRICS & GYNECOLOGY

## 2020-05-10 PROCEDURE — 0UB70ZZ EXCISION OF BILATERAL FALLOPIAN TUBES, OPEN APPROACH: ICD-10-PCS | Performed by: OBSTETRICS & GYNECOLOGY

## 2020-05-10 PROCEDURE — 12000000 ZZH R&B MED SURG/OB

## 2020-05-10 PROCEDURE — 25800030 ZZH RX IP 258 OP 636: Performed by: OBSTETRICS & GYNECOLOGY

## 2020-05-10 PROCEDURE — 25000132 ZZH RX MED GY IP 250 OP 250 PS 637: Performed by: OBSTETRICS & GYNECOLOGY

## 2020-05-10 PROCEDURE — 25000128 H RX IP 250 OP 636: Performed by: NURSE ANESTHETIST, CERTIFIED REGISTERED

## 2020-05-10 PROCEDURE — 36415 COLL VENOUS BLD VENIPUNCTURE: CPT | Performed by: OBSTETRICS & GYNECOLOGY

## 2020-05-10 PROCEDURE — 27210794 ZZH OR GENERAL SUPPLY STERILE: Performed by: OBSTETRICS & GYNECOLOGY

## 2020-05-10 PROCEDURE — 71000012 ZZH RECOVERY PHASE 1 LEVEL 1 FIRST HR: Performed by: OBSTETRICS & GYNECOLOGY

## 2020-05-10 PROCEDURE — 85027 COMPLETE CBC AUTOMATED: CPT | Performed by: OBSTETRICS & GYNECOLOGY

## 2020-05-10 PROCEDURE — 88302 TISSUE EXAM BY PATHOLOGIST: CPT | Mod: 26 | Performed by: OBSTETRICS & GYNECOLOGY

## 2020-05-10 PROCEDURE — 37000009 ZZH ANESTHESIA TECHNICAL FEE, EACH ADDTL 15 MIN: Performed by: OBSTETRICS & GYNECOLOGY

## 2020-05-10 PROCEDURE — 36000050 ZZH SURGERY LEVEL 2 1ST 30 MIN: Performed by: OBSTETRICS & GYNECOLOGY

## 2020-05-10 PROCEDURE — 88302 TISSUE EXAM BY PATHOLOGIST: CPT | Performed by: OBSTETRICS & GYNECOLOGY

## 2020-05-10 PROCEDURE — 25000125 ZZHC RX 250: Performed by: NURSE ANESTHETIST, CERTIFIED REGISTERED

## 2020-05-10 PROCEDURE — 25000128 H RX IP 250 OP 636: Performed by: ANESTHESIOLOGY

## 2020-05-10 PROCEDURE — 37000008 ZZH ANESTHESIA TECHNICAL FEE, 1ST 30 MIN: Performed by: OBSTETRICS & GYNECOLOGY

## 2020-05-10 PROCEDURE — 40000306 ZZH STATISTIC PRE PROC ASSESS II: Performed by: OBSTETRICS & GYNECOLOGY

## 2020-05-10 RX ORDER — DEXAMETHASONE SODIUM PHOSPHATE 4 MG/ML
INJECTION, SOLUTION INTRA-ARTICULAR; INTRALESIONAL; INTRAMUSCULAR; INTRAVENOUS; SOFT TISSUE PRN
Status: DISCONTINUED | OUTPATIENT
Start: 2020-05-10 | End: 2020-05-10

## 2020-05-10 RX ORDER — ONDANSETRON 2 MG/ML
4 INJECTION INTRAMUSCULAR; INTRAVENOUS EVERY 30 MIN PRN
Status: DISCONTINUED | OUTPATIENT
Start: 2020-05-10 | End: 2020-05-10 | Stop reason: HOSPADM

## 2020-05-10 RX ORDER — SODIUM CHLORIDE, SODIUM LACTATE, POTASSIUM CHLORIDE, CALCIUM CHLORIDE 600; 310; 30; 20 MG/100ML; MG/100ML; MG/100ML; MG/100ML
INJECTION, SOLUTION INTRAVENOUS CONTINUOUS
Status: DISCONTINUED | OUTPATIENT
Start: 2020-05-10 | End: 2020-05-11 | Stop reason: HOSPADM

## 2020-05-10 RX ORDER — ALBUTEROL SULFATE 0.83 MG/ML
2.5 SOLUTION RESPIRATORY (INHALATION) EVERY 4 HOURS PRN
Status: DISCONTINUED | OUTPATIENT
Start: 2020-05-10 | End: 2020-05-10 | Stop reason: HOSPADM

## 2020-05-10 RX ORDER — GLYCOPYRROLATE 0.2 MG/ML
INJECTION, SOLUTION INTRAMUSCULAR; INTRAVENOUS PRN
Status: DISCONTINUED | OUTPATIENT
Start: 2020-05-10 | End: 2020-05-10

## 2020-05-10 RX ORDER — PROPOFOL 10 MG/ML
INJECTION, EMULSION INTRAVENOUS PRN
Status: DISCONTINUED | OUTPATIENT
Start: 2020-05-10 | End: 2020-05-10

## 2020-05-10 RX ORDER — DIMENHYDRINATE 50 MG/ML
25 INJECTION, SOLUTION INTRAMUSCULAR; INTRAVENOUS
Status: DISCONTINUED | OUTPATIENT
Start: 2020-05-10 | End: 2020-05-10 | Stop reason: HOSPADM

## 2020-05-10 RX ORDER — KETOROLAC TROMETHAMINE 30 MG/ML
INJECTION, SOLUTION INTRAMUSCULAR; INTRAVENOUS PRN
Status: DISCONTINUED | OUTPATIENT
Start: 2020-05-10 | End: 2020-05-10

## 2020-05-10 RX ORDER — NALOXONE HYDROCHLORIDE 0.4 MG/ML
.1-.4 INJECTION, SOLUTION INTRAMUSCULAR; INTRAVENOUS; SUBCUTANEOUS
Status: CANCELLED | OUTPATIENT
Start: 2020-05-10 | End: 2020-05-11

## 2020-05-10 RX ORDER — LIDOCAINE HYDROCHLORIDE 10 MG/ML
INJECTION, SOLUTION INFILTRATION; PERINEURAL PRN
Status: DISCONTINUED | OUTPATIENT
Start: 2020-05-10 | End: 2020-05-10

## 2020-05-10 RX ORDER — NEOSTIGMINE METHYLSULFATE 1 MG/ML
VIAL (ML) INJECTION PRN
Status: DISCONTINUED | OUTPATIENT
Start: 2020-05-10 | End: 2020-05-10

## 2020-05-10 RX ORDER — ONDANSETRON 4 MG/1
4 TABLET, ORALLY DISINTEGRATING ORAL EVERY 30 MIN PRN
Status: DISCONTINUED | OUTPATIENT
Start: 2020-05-10 | End: 2020-05-10 | Stop reason: HOSPADM

## 2020-05-10 RX ORDER — NALOXONE HYDROCHLORIDE 0.4 MG/ML
.1-.4 INJECTION, SOLUTION INTRAMUSCULAR; INTRAVENOUS; SUBCUTANEOUS
Status: CANCELLED | OUTPATIENT
Start: 2020-05-10

## 2020-05-10 RX ORDER — FENTANYL CITRATE 50 UG/ML
25-50 INJECTION, SOLUTION INTRAMUSCULAR; INTRAVENOUS
Status: DISCONTINUED | OUTPATIENT
Start: 2020-05-10 | End: 2020-05-10 | Stop reason: HOSPADM

## 2020-05-10 RX ORDER — HYDROMORPHONE HYDROCHLORIDE 1 MG/ML
.3-.5 INJECTION, SOLUTION INTRAMUSCULAR; INTRAVENOUS; SUBCUTANEOUS EVERY 5 MIN PRN
Status: DISCONTINUED | OUTPATIENT
Start: 2020-05-10 | End: 2020-05-10 | Stop reason: HOSPADM

## 2020-05-10 RX ORDER — ONDANSETRON 2 MG/ML
INJECTION INTRAMUSCULAR; INTRAVENOUS PRN
Status: DISCONTINUED | OUTPATIENT
Start: 2020-05-10 | End: 2020-05-10

## 2020-05-10 RX ORDER — SODIUM CHLORIDE, SODIUM LACTATE, POTASSIUM CHLORIDE, CALCIUM CHLORIDE 600; 310; 30; 20 MG/100ML; MG/100ML; MG/100ML; MG/100ML
INJECTION, SOLUTION INTRAVENOUS CONTINUOUS
Status: DISCONTINUED | OUTPATIENT
Start: 2020-05-10 | End: 2020-05-10 | Stop reason: HOSPADM

## 2020-05-10 RX ORDER — MEPERIDINE HYDROCHLORIDE 50 MG/ML
12.5 INJECTION INTRAMUSCULAR; INTRAVENOUS; SUBCUTANEOUS EVERY 5 MIN PRN
Status: DISCONTINUED | OUTPATIENT
Start: 2020-05-10 | End: 2020-05-10 | Stop reason: HOSPADM

## 2020-05-10 RX ORDER — DIAZEPAM 10 MG/2ML
2.5 INJECTION, SOLUTION INTRAMUSCULAR; INTRAVENOUS
Status: DISCONTINUED | OUTPATIENT
Start: 2020-05-10 | End: 2020-05-10 | Stop reason: HOSPADM

## 2020-05-10 RX ORDER — FENTANYL CITRATE 50 UG/ML
INJECTION, SOLUTION INTRAMUSCULAR; INTRAVENOUS PRN
Status: DISCONTINUED | OUTPATIENT
Start: 2020-05-10 | End: 2020-05-10

## 2020-05-10 RX ADMIN — FENTANYL CITRATE 100 MCG: 50 INJECTION, SOLUTION INTRAMUSCULAR; INTRAVENOUS at 12:07

## 2020-05-10 RX ADMIN — MIDAZOLAM 2 MG: 1 INJECTION INTRAMUSCULAR; INTRAVENOUS at 12:02

## 2020-05-10 RX ADMIN — GLYCOPYRROLATE 0.6 MG: 0.2 INJECTION, SOLUTION INTRAMUSCULAR; INTRAVENOUS at 12:45

## 2020-05-10 RX ADMIN — HYDROMORPHONE HYDROCHLORIDE 0.5 MG: 1 INJECTION, SOLUTION INTRAMUSCULAR; INTRAVENOUS; SUBCUTANEOUS at 12:07

## 2020-05-10 RX ADMIN — IBUPROFEN 800 MG: 800 TABLET ORAL at 18:03

## 2020-05-10 RX ADMIN — PROPOFOL 200 MG: 10 INJECTION, EMULSION INTRAVENOUS at 12:07

## 2020-05-10 RX ADMIN — SENNOSIDES AND DOCUSATE SODIUM 1 TABLET: 8.6; 5 TABLET ORAL at 20:02

## 2020-05-10 RX ADMIN — LIDOCAINE HYDROCHLORIDE 50 MG: 10 INJECTION, SOLUTION INFILTRATION; PERINEURAL at 12:07

## 2020-05-10 RX ADMIN — HYDROMORPHONE HYDROCHLORIDE 0.5 MG: 1 INJECTION, SOLUTION INTRAMUSCULAR; INTRAVENOUS; SUBCUTANEOUS at 13:15

## 2020-05-10 RX ADMIN — ACETAMINOPHEN 650 MG: 325 TABLET, FILM COATED ORAL at 20:02

## 2020-05-10 RX ADMIN — SODIUM CHLORIDE, POTASSIUM CHLORIDE, SODIUM LACTATE AND CALCIUM CHLORIDE: 600; 310; 30; 20 INJECTION, SOLUTION INTRAVENOUS at 00:02

## 2020-05-10 RX ADMIN — SODIUM CHLORIDE, POTASSIUM CHLORIDE, SODIUM LACTATE AND CALCIUM CHLORIDE: 600; 310; 30; 20 INJECTION, SOLUTION INTRAVENOUS at 09:56

## 2020-05-10 RX ADMIN — ROCURONIUM BROMIDE 35 MG: 10 INJECTION INTRAVENOUS at 12:07

## 2020-05-10 RX ADMIN — ACETAMINOPHEN 650 MG: 325 TABLET, FILM COATED ORAL at 16:01

## 2020-05-10 RX ADMIN — KETOROLAC TROMETHAMINE 30 MG: 30 INJECTION, SOLUTION INTRAMUSCULAR at 12:07

## 2020-05-10 RX ADMIN — ONDANSETRON HYDROCHLORIDE 4 MG: 2 INJECTION, SOLUTION INTRAVENOUS at 12:07

## 2020-05-10 RX ADMIN — Medication 4 MG: at 12:45

## 2020-05-10 RX ADMIN — GLYCOPYRROLATE 0.2 MG: 0.2 INJECTION, SOLUTION INTRAMUSCULAR; INTRAVENOUS at 12:07

## 2020-05-10 RX ADMIN — DEXAMETHASONE SODIUM PHOSPHATE 8 MG: 4 INJECTION, SOLUTION INTRA-ARTICULAR; INTRALESIONAL; INTRAMUSCULAR; INTRAVENOUS; SOFT TISSUE at 12:07

## 2020-05-10 RX ADMIN — ACETAMINOPHEN 650 MG: 325 TABLET, FILM COATED ORAL at 06:42

## 2020-05-10 SDOH — HEALTH STABILITY: MENTAL HEALTH: CURRENT SMOKER: 0

## 2020-05-10 NOTE — PROVIDER NOTIFICATION
05/10/20 1415   Provider Notification   Provider Name/Title DR Li    Method of Notification Phone   Notification Reason Other   Comments   Comments post op orders   MD will try to put in orders but stated post op tubal order set as per usual .are ok

## 2020-05-10 NOTE — OR NURSING
Report given to Dalia on 4th floor.  [facility] [place] to transport patient upstairs.  Beena and patient advised to have patient void on arrival to patient's room.

## 2020-05-10 NOTE — PLAN OF CARE
Vitals stable this shift. Started on LR at 0000 for AM procedure. NPO at this time. Independently breastfeeding baby. Ambulating to bathroom. Declined pain medication at this time.

## 2020-05-10 NOTE — ANESTHESIA CARE TRANSFER NOTE
Patient: Linda Serrano    Procedure(s):  BILATERAL PARTIAL SALPINGECTOMY    Diagnosis: Consultation for sterilization [Z30.09]  Diagnosis Additional Information: No value filed.    Anesthesia Type:   General     Note:  Airway :Face Mask  Patient transferred to:PACU  Handoff Report: Identifed the Patient, Identified the Reponsible Provider, Reviewed the pertinent medical history, Discussed the surgical course, Reviewed Intra-OP anesthesia mangement and issues during anesthesia, Set expectations for post-procedure period and Allowed opportunity for questions and acknowledgement of understanding      Vitals: (Last set prior to Anesthesia Care Transfer)    CRNA VITALS  5/10/2020 1216 - 5/10/2020 1304      5/10/2020             Resp Rate (observed):  (!) 1                Electronically Signed By: Dean Dennis Severson, APRN CRNA  May 10, 2020  1:04 PM

## 2020-05-10 NOTE — PLAN OF CARE
VSS, patient up to bathroom independently, remains NPO for PPTL scheduled at 11.15 am today. IV LR infusing, clotilde cloths to abdomen prior to leaving unit. OR staff aware federal consent for sterilization needs to be printed . Significant other remains in room with baby boy.  1 4.20  Patient back in room, helped out to  bathroom, voided without difficulty, tolerating oral fluids, IV discontinued. Incision clean dry and intact. VSS. Ice to  abdomen for comfort. Stable at this time.   .Patients mobililty level scored using the bedside mobility assistance tool (BMAT). Patient is at a mobility level test number: 3. Mobility equipment used: none required . Required assist of 1 staff members. Further use of BMAT scoring required.Patient advised to call nursing when she wants to get out of bed to assess mobility.

## 2020-05-10 NOTE — ADDENDUM NOTE
Addendum  created 05/10/20 7410 by Lawson Mckeon MD    Order list changed, Order sets accessed

## 2020-05-10 NOTE — ANESTHESIA PREPROCEDURE EVALUATION
Anesthesia Pre-Procedure Evaluation    Patient: Linda Serrano   MRN: [id number] : 1990          Preoperative Diagnosis: Consultation for sterilization [Z30.09]    Procedure(s):  LIGATION, FALLOPIAN TUBE, POSTPARTUM    Past Medical History:   Diagnosis Date     Palpitations      S/P ablation of ventricular arrhythmia 2017     SVT (supraventricular tachycardia) (H)      Past Surgical History:   Procedure Laterality Date     DRAIN SKIN ABSCESS SIMPLE      left thumb     STRESS ECHO ([facility])  2010    Normal     Anesthesia Evaluation     . Pt has had prior anesthetic. Type: General and MAC    No history of anesthetic complications          ROS/MED HX    ENT/Pulmonary:  - neg pulmonary ROS     Neurologic:  - neg neurologic ROS     Cardiovascular: Comment: S/P ablation for reentrant tachycardia        METS/Exercise Tolerance:     Hematologic:  - neg hematologic  ROS       Musculoskeletal:  - neg musculoskeletal ROS       GI/Hepatic:  - neg GI/hepatic ROS       Renal/Genitourinary:  - ROS Renal section negative       Endo:  - neg endo ROS       Psychiatric:  - neg psychiatric ROS       Infectious Disease:  - neg infectious disease ROS       Malignancy:      - no malignancy   Other:    - neg other ROS                      Physical Exam  Normal systems: cardiovascular, pulmonary and dental    Airway   Mallampati: II  TM distance: >3 FB  Neck ROM: full    Dental     Cardiovascular   Rhythm and rate: regular and normal      Pulmonary     Other findings: Lab Test        05/10/20     05/09/20     01/31/19      --          17                       0649          0900          0654           --           1155          WBC          13.4*        10.6          --           --          5.1           HGB          11.3*        11.9         11.9           < >        12.9          MCV          91           90            --           --          86            PLT          109*         118*          --           --         "  166            < > = values in this interval not displayed.                  Lab Test        09/06/17                       1155          NA           139           POTASSIUM    4.0           CHLORIDE     107           CO2          27            BUN          9             CR           0.79          ANIONGAP     5             TIFFANI          9.3           GLC          84                           Lab Results   Component Value Date    WBC 13.4 (H) 05/10/2020    HGB 11.3 (L) 05/10/2020    HCT 36.9 05/10/2020     (L) 05/10/2020     09/06/2017    POTASSIUM 4.0 09/06/2017    CHLORIDE 107 09/06/2017    CO2 27 09/06/2017    BUN 9 09/06/2017    CR 0.79 09/06/2017    GLC 84 09/06/2017    TIFFANI 9.3 09/06/2017    TSH 1.32 10/16/2017    HCGS Negative 09/06/2017       Preop Vitals  BP Readings from Last 3 Encounters:   05/10/20 107/79   02/08/19 115/82   02/01/19 119/75    Pulse Readings from Last 3 Encounters:   05/10/20 84   02/08/19 63   02/01/19 68      Resp Readings from Last 3 Encounters:   05/10/20 18   02/01/19 16   09/06/17 16    SpO2 Readings from Last 3 Encounters:   01/25/19 100%   09/06/17 98%      Temp Readings from Last 1 Encounters:   05/10/20 98.1  F (36.7  C) (Oral)    Ht Readings from Last 1 Encounters:   05/09/20 1.651 m (5' 5\")      Wt Readings from Last 1 Encounters:   05/09/20 88.5 kg (195 lb)    Estimated body mass index is 32.45 kg/m  as calculated from the following:    Height as of this encounter: 1.651 m (5' 5\").    Weight as of this encounter: 88.5 kg (195 lb).       Anesthesia Plan      History & Physical Review  History and physical reviewed and following examination; no interval change.    ASA Status:  2 .    NPO Status:  > 8 hours    Plan for General with Intravenous induction. Maintenance will be Balanced.    PONV prophylaxis:  Ondansetron (or other 5HT-3) and Dexamethasone or Solumedrol    The patient is not a current smoker      Postoperative Care  Postoperative pain management:  IV " analgesics, Oral pain medications and Multi-modal analgesia.      Consents  Anesthetic plan, risks, benefits and alternatives discussed with:  Patient..                 Lawson Mckeon MD                    .

## 2020-05-10 NOTE — PLAN OF CARE
Patient transferred to [facility]. Orientated to room, call bell, johny. Vitals and postpartum check within normal. Denies need for pain meds. Up to bathroom, voiding well. Saline lock flushed. Educated on npo at midnight. Spouse at bedside afebrile.     Patients mobililty level scored using the bedside mobility assistance tool (BMAT). Patient is at a mobility level test number: 4. Mobility equipment used: none required. Required assist of 0 staff members. Further use of BMAT scoring not required.

## 2020-05-10 NOTE — ANESTHESIA POSTPROCEDURE EVALUATION
Patient: Linda Serrano    Procedure(s):  BILATERAL PARTIAL SALPINGECTOMY    Diagnosis:Consultation for sterilization [Z30.09]  Diagnosis Additional Information: No value filed.    Anesthesia Type:  General    Note:  Anesthesia Post Evaluation    Patient location during evaluation: PACU  Patient participation: Able to fully participate in evaluation  Level of consciousness: sleepy but conscious  Pain management: adequate  multimodal analgesia used between 6 hours prior to anesthesia start to PACU dischargeAirway patency: patent  Cardiovascular status: acceptable  Respiratory status: acceptable  Hydration status: acceptable  PONV: none             Last vitals:  Vitals:    05/10/20 0840 05/10/20 1020 05/10/20 1300   BP: 124/76 129/75 118/80   Pulse:   54   Resp: 16 16 16   Temp: 98  F (36.7  C) 97.5  F (36.4  C) 96.8  F (36  C)   SpO2:  99% 96%         Electronically Signed By: Lawson Mckeon MD  May 10, 2020  1:12 PM

## 2020-05-10 NOTE — BRIEF OP NOTE
[facility] Brief Operative Note    Pre-operative diagnosis: Consultation for sterilization [Z30.09]   Post-operative diagnosis same   Procedure: Procedure(s):  BILATERAL PARTIAL SALPINGECTOMY   Surgeon(s): Surgeon(s) and Role:     * Carrie Gipson MD - Primary   Estimated blood loss: * No values recorded between 5/10/2020 12:16 PM and 5/10/2020 12:53 PM *    Specimens: ID Type Source Tests Collected by Time Destination   A : bilateral fallopian tubes Tissue Fallopian Tube, Bilateral SURGICAL PATHOLOGY EXAM Carrie Gipson MD 5/10/2020 12:29 PM       Findings: Normal ovaries bilaterally; normal FT    No complications

## 2020-05-11 VITALS
BODY MASS INDEX: 32.49 KG/M2 | HEIGHT: 65 IN | OXYGEN SATURATION: 96 % | DIASTOLIC BLOOD PRESSURE: 76 MMHG | HEART RATE: 62 BPM | TEMPERATURE: 98 F | SYSTOLIC BLOOD PRESSURE: 124 MMHG | RESPIRATION RATE: 18 BRPM | WEIGHT: 195 LBS

## 2020-05-11 PROCEDURE — 25000132 ZZH RX MED GY IP 250 OP 250 PS 637: Performed by: OBSTETRICS & GYNECOLOGY

## 2020-05-11 RX ORDER — ACETAMINOPHEN 325 MG/1
650 TABLET ORAL EVERY 4 HOURS PRN
COMMUNITY
Start: 2020-05-11

## 2020-05-11 RX ORDER — AMOXICILLIN 250 MG
1 CAPSULE ORAL DAILY PRN
Qty: 40 TABLET | Refills: 0 | Status: SHIPPED | OUTPATIENT
Start: 2020-05-11

## 2020-05-11 RX ORDER — IBUPROFEN 600 MG/1
600 TABLET, FILM COATED ORAL EVERY 6 HOURS PRN
Qty: 40 TABLET | Refills: 0 | Status: SHIPPED | OUTPATIENT
Start: 2020-05-11

## 2020-05-11 RX ADMIN — SENNOSIDES AND DOCUSATE SODIUM 2 TABLET: 8.6; 5 TABLET ORAL at 08:47

## 2020-05-11 RX ADMIN — IBUPROFEN 800 MG: 800 TABLET ORAL at 00:21

## 2020-05-11 RX ADMIN — ACETAMINOPHEN 650 MG: 325 TABLET, FILM COATED ORAL at 00:21

## 2020-05-11 RX ADMIN — IBUPROFEN 800 MG: 800 TABLET ORAL at 06:45

## 2020-05-11 RX ADMIN — ACETAMINOPHEN 650 MG: 325 TABLET, FILM COATED ORAL at 04:57

## 2020-05-11 NOTE — LACTATION NOTE
This note was copied from a baby's chart.  LC visit.  Infant has been nursing well and often. No questions or concerns and milk is coming in. LC reviewed engorgement, symptoms of mastitis, and when to call her doctor.

## 2020-05-11 NOTE — PLAN OF CARE
Meeting goals for shift and discharge to home, see flow sheet. Caring for self and infant in room with spouse. IBU and tylenol for comfort which has been adequate. Ready for discharge to home.

## 2020-05-11 NOTE — PROGRESS NOTES
OB Post-partum Note  PPD#2    S:  Patient doing well.  Pain well controlled.  Voiding. Lochia is normal.  Breastfeeding.     O:    Vitals:    05/10/20 1700 05/10/20 1803 05/10/20 1900 20 0020   BP: 126/74 120/70 120/65 116/73   Pulse: 69 71 62    Resp: 18 18 18 18   Temp: 98.4  F (36.9  C)  98.3  F (36.8  C) 98.3  F (36.8  C)   TempSrc: Oral  Oral Oral   SpO2:       Weight:       Height:         Gen- A&O, NAD  Abd- Non-tender, fundus firm at umbilicus  Ext- non-tender, scant edema  Incision: C/D/I    Hemoglobin   Date Value Ref Range Status   05/10/2020 11.3 (L) 11.7 - 15.7 g/dL Final     O pos  Rubella Immune    A/P: 29 year old  PPD#2 s/p  and POD#1 s/p postpartum sterilization. Doing well.     - Routine post-partum cares.  - Pain: Continue tylenol, ibuprofen prn  - Acute blood loss anemia, Hgb 11.3, patient asymptomatic  - Prenatal: Rh pos, Rubella immune    Dispo: Anticipate d/c home on today, follow up in 6 weeks. Reviewed preeclampsia precautions.    Vandana Mcgee MD  2020  8:27 AM

## 2020-05-11 NOTE — DISCHARGE INSTRUCTIONS
Lactation [phone]     Preeclampsia   Call your doctor right away if you have any of the following:  - Edema (swelling) in your face or hands  - Rapid weight gain-about 1 pound or more in a day  - Headache  - Abdominal pain on your right side  - Vision problems (flashes or spots)  - You have questions or concerns once you return home.    Your Laparoscopic Tubal Sterilization Procedure  Getting ready for surgery  Your doctor will talk with you about preparing for surgery. You will need to:    Sign a sterilization consent form. This often must be signed weeks in advance.    Have tests, such as blood tests. These help show your general health.    Tell your doctor if you take any medicines, supplements, or herbal remedies. You may need to stop taking some of them before surgery.    Stop eating and drinking anything after midnight, the night before surgery.    Arrange for an adult family member or friend to give you a ride home after surgery.    Arrive at the hospital or surgical facility on time. You will be asked to sign certain forms and change into a patient gown.  During surgery    You ll be given an IV (intravenous line) and medicine that lets you sleep during surgery.    After the anesthesia takes effect, your surgeon makes a small incision in or below your navel.    Your abdomen is inflated with small amounts of gas to lift the abdominal wall. This makes it easier to guide instruments to the tubes.    Your surgeon then inserts the laparoscope to view the organs in your abdomen.    Surgical instruments may be placed through the laparoscope or through other small incisions.    The fallopian tubes are blocked using one of several methods (see below).    Once the tubes are blocked, your surgeon slowly releases the gas and removes the instruments.    The incisions are closed with sutures or staples.  Blocking the fallopian tubes  To block the tubes, your surgeon will use one of the methods listed below.        Cauterization uses electrical current to heat and seal each tube. The sealed ends of the tubes may then be cut. A ring or band closes each tube, keeping egg and sperm from being able to meet. It is left in place. A clip shuts off each tube, blocking the passage of sperm and egg. It is left in place.   After surgery  You ll rest in the recovery area until you feel well enough to go home. Be sure to have an adult friend or family member drive you. You will likely feel tired, so take it easy for the rest of the day. Ask your doctor when it s OK to resume your normal routine. For the first few days you may have:    Pain at the incision sites. Use pain relief medicine if needed.    Shoulder pain. This is caused by the gas used during surgery. You may also have a gassy or bloated feeling.    A small amount of vaginal bleeding. Use pads instead of tampons.  When to call your healthcare provider  Call your healthcare provider if you have:    Redness, drainage, or swelling at the incisions    A fever of 100.4 F (38 C) or higher, or as directed by your healthcare provider    Difficulty urinating    Foul-smelling or unusual vaginal discharge    Severe abdominal pain or bloating    Nausea or vomiting    Persistent or heavy bleeding. This means more than a pad an hour for 2 hours.    A missed period, irregular bleeding, or severe abdominal pain. These symptoms can be signs of a tubal pregnancy.   Date Last Reviewed: 8/1/2017 2000-2019 [employer]. [address] [place] [address]. All rights reserved. This information is not intended as a substitute for professional medical care. Always follow your healthcare professional's instructions.      Postpartum Vaginal Delivery Instructions    Activity       Ask family and friends for help when you need it.    Do not place anything in your vagina for 6 weeks.    You are not restricted on other activities, but take it easy for a few weeks to allow your body to  recover from delivery.  You are able to do any activities you feel up to that point.    No driving until you have stopped taking your pain medications (usually two weeks after delivery).     Call your health care provider if you have any of these symptoms:       Increased pain, swelling, redness, or fluid around your stiches from an episiotomy or perineal tear.    A fever above 100.4 F (38 C) with or without chills when placing a thermometer under your tongue.    You soak a sanitary pad with blood within 1 hour, or you see blood clots larger than a golf ball.    Bleeding that lasts more than 6 weeks.    Vaginal discharge that smells bad.    Severe pain, cramping or tenderness in your lower belly area.    A need to urinate more frequently (use the toilet more often), more urgently (use the toilet very quickly), or it burns when you urinate.    Nausea and vomiting.    Redness, swelling or pain around a vein in your leg.    Problems breastfeeding or a red or painful area on your breast.    Chest pain and cough or are gasping for air.    Problems coping with sadness, anxiety, or depression.  If you have any concerns about hurting yourself or the baby, call your provider immediately.     You have questions or concerns after you return home.     Keep your hands clean:  Always wash your hands before touching your perineal area and stitches.  This helps reduce your risk of infection.  If your hands aren't dirty, you may use an alcohol hand-rub to clean your hands. Keep your nails clean and short.

## 2020-05-11 NOTE — PLAN OF CARE
Vitals stable.  Incision site covered with no drainage. Independent with cares. Pain controlled by ibuprofen and tylenol. Bonding well with baby.  at bedside and supportive.

## 2020-05-11 NOTE — PLAN OF CARE
Vitals stable. Up void large amount. Tolerating regular diet. Dressing dry and intact. Motrin, tylenol and ice pack for discomfort. Pain 2-3 this shift. Breastfeeding infant well.

## 2020-05-11 NOTE — PLAN OF CARE
AVS/DC teaching and medications reviewed with patient, spouse present.. Patient is aware of when to call and follow-up. Patient is aware of resources available.  Aware of [facility] scale and when to retake and call.Teaching is completed. Discharged to home with baby at 1125 am.

## 2020-05-11 NOTE — OP NOTE
Procedure Date: 05/10/2020      ATTENDING PHYSICIAN:  Carrie Gipson MD      PREOPERATIVE DIAGNOSES:  Postpartum and desires permanent sterilization.      POSTOPERATIVE DIAGNOSES:  Postpartum and desires permanent sterilization.      PROCEDURES:  Postpartum partial bilateral salpingectomy.      SURGEON:  Carrie Gipson MD      ANESTHESIA:  General.      ESTIMATED BLOOD LOSS:  10 mL.      SPECIMENS:  Right and left fallopian tube.      COMPLICATIONS:  None.      INDICATIONS:  The patient is a 29-year-old now  2, para 2-0-0-2, who is postpartum day #1.  She had, during her prenatal care, previously discussed and consented to permanent sterilization following delivery.  Consent was reobtained today.  The patient understands that this is a nonreversible procedure and that she will no longer be able to become pregnant.  Risks and benefits of surgery were discussed with the patient, including risks of bleeding, infection and tubal failure, risk of pregnancy after partial salpingectomy less than 1%.  Consent was signed.      DESCRIPTION OF PROCEDURE:  The patient was taken to the operating room, where general anesthesia was given and found to be adequate.  The patient had previously tested COVID negative.      She was prepped and draped in normal sterile fashion, and timeout was performed.  A 3 cm incision was made transversely 1 cm below the umbilicus.  This incision was carried down through the subcutaneous layer until the fascia was identified.  Fascia was grasped, entered sharply with Metzenbaum scissors, and this incision was extended transversely with Frederick scissors.  At this time, peritoneum was grasped with a [place] and entered bluntly.  Retractors were placed into the abdominal cavity.  Uterus was palpated.  The patient was then placed into Trendelenburg, and moist laparotomy sponge was used to remove the omentum from the uterine fundus.      At this time, an extra small Jimmie retractor was placed.       The patient was rotated completely to her right.  The left fallopian tube was identified, grasped with [place] and followed out to the fimbriated end.  A small handheld LigaSure was then used to excise 2/3 of the fallopian tube from the mesosalpinx and then completely transect the fallopian tube.  This was handed for final pathology, and excellent hemostasis was noted.  The patient was then completely rotated to the other side.  The right fallopian tube was then identified, grasped with a Ivy and followed out to the fimbriated end.  The handheld LigaSure was then used to excise 2/3 of the fallopian tube from the mesosalpinx, and the fallopian tube was completely transected with the LigaSure device approximately 2 cm from the cornual region.  Excellent hemostasis was noted.  Both portions of fallopian tube were handed off for final pathology.      At this time, the patient was straightened out.  The fascia was identified, and the incision was closed incorporating the peritoneum using 0 Vicryl in a running fashion.  Subcutaneous layer was irrigated, cautery used for hemostasis.  Two 3-0 Vicryl sutures were used to reapproximate the subcutaneous layer, and skin was closed using Dermabond.      At the completion of the procedure, sponge and needle counts were correct x 2.  The patient was taken to recovery in stable condition.      SPECIMENS:  Included portions of right and left fallopian tube.         ADI CRUZ MD             D: 05/10/2020   T: 05/10/2020   MT: ROSMERY      Name:     IRAJ MULLINS   MRN:      [id number]        Account:        [id number]   :      1990           Procedure Date: 05/10/2020      Document: [id number]

## 2020-05-12 LAB — COPATH REPORT: NORMAL

## (undated) DEVICE — PAD CHUX UNDERPAD 30X36" P3036C

## (undated) DEVICE — PACK MINOR CUSTOM RIDGES SBA32RMRMA

## (undated) DEVICE — GLOVE PROTEXIS BLUE W/NEU-THERA 6.5  2D73EB65

## (undated) DEVICE — PREP CHLORAPREP 26ML TINTED ORANGE  260815

## (undated) DEVICE — DRAPE LAP W/ARMBOARD 29410

## (undated) DEVICE — SU PLAIN 2-0 CT-3 27" N863H

## (undated) DEVICE — SOL NACL 0.9% IRRIG 1000ML BOTTLE 2F7124

## (undated) DEVICE — SU VICRYL 2-0 CT-2 27" UND J269H

## (undated) DEVICE — LINEN TOWEL PACK X10 5473

## (undated) DEVICE — DRSG TELFA 2X3"

## (undated) DEVICE — SU VICRYL 0 CT-2 27" UND J270H

## (undated) DEVICE — BAG CLEAR TRASH 1.3M 39X33" P4040C

## (undated) DEVICE — GLOVE PROTEXIS POWDER FREE 6.5 ORTHOPEDIC 2D73ET65

## (undated) DEVICE — ADH SKIN CLOSURE PREMIERPRO EXOFIN 1.0ML 3470

## (undated) DEVICE — LINEN FULL SHEET 5511

## (undated) DEVICE — DRSG STERI STRIP 1/2X4" R1547

## (undated) DEVICE — ESU GROUND PAD ADULT W/CORD E7507

## (undated) DEVICE — ESU LIGASURE OPEN SEALER/DIVIDER SM JAW 16.5MM LF1212A

## (undated) DEVICE — LINEN HALF SHEET 5512

## (undated) DEVICE — Device

## (undated) RX ORDER — FENTANYL CITRATE 50 UG/ML
INJECTION, SOLUTION INTRAMUSCULAR; INTRAVENOUS
Status: DISPENSED
Start: 2017-09-06

## (undated) RX ORDER — ONDANSETRON 2 MG/ML
INJECTION INTRAMUSCULAR; INTRAVENOUS
Status: DISPENSED
Start: 2020-05-10

## (undated) RX ORDER — NEOSTIGMINE METHYLSULFATE 1 MG/ML
VIAL (ML) INJECTION
Status: DISPENSED
Start: 2020-05-10

## (undated) RX ORDER — LIDOCAINE HYDROCHLORIDE 10 MG/ML
INJECTION, SOLUTION EPIDURAL; INFILTRATION; INTRACAUDAL; PERINEURAL
Status: DISPENSED
Start: 2017-09-06

## (undated) RX ORDER — HEPARIN SODIUM 1000 [USP'U]/ML
INJECTION, SOLUTION INTRAVENOUS; SUBCUTANEOUS
Status: DISPENSED
Start: 2017-09-06

## (undated) RX ORDER — ADENOSINE 3 MG/ML
INJECTION, SOLUTION INTRAVENOUS
Status: DISPENSED
Start: 2017-09-06

## (undated) RX ORDER — GLYCOPYRROLATE 0.2 MG/ML
INJECTION INTRAMUSCULAR; INTRAVENOUS
Status: DISPENSED
Start: 2020-05-10

## (undated) RX ORDER — FENTANYL CITRATE 50 UG/ML
INJECTION, SOLUTION INTRAMUSCULAR; INTRAVENOUS
Status: DISPENSED
Start: 2020-05-10

## (undated) RX ORDER — DOBUTAMINE HYDROCHLORIDE 200 MG/100ML
INJECTION INTRAVENOUS
Status: DISPENSED
Start: 2017-09-06

## (undated) RX ORDER — PROPOFOL 10 MG/ML
INJECTION, EMULSION INTRAVENOUS
Status: DISPENSED
Start: 2020-05-10

## (undated) RX ORDER — HYDROMORPHONE HYDROCHLORIDE 1 MG/ML
INJECTION, SOLUTION INTRAMUSCULAR; INTRAVENOUS; SUBCUTANEOUS
Status: DISPENSED
Start: 2020-05-10

## (undated) RX ORDER — DEXAMETHASONE SODIUM PHOSPHATE 4 MG/ML
INJECTION, SOLUTION INTRA-ARTICULAR; INTRALESIONAL; INTRAMUSCULAR; INTRAVENOUS; SOFT TISSUE
Status: DISPENSED
Start: 2020-05-10

## (undated) RX ORDER — LIDOCAINE HYDROCHLORIDE 10 MG/ML
INJECTION, SOLUTION EPIDURAL; INFILTRATION; INTRACAUDAL; PERINEURAL
Status: DISPENSED
Start: 2020-05-10